# Patient Record
Sex: MALE | Race: WHITE | Employment: OTHER | URBAN - METROPOLITAN AREA
[De-identification: names, ages, dates, MRNs, and addresses within clinical notes are randomized per-mention and may not be internally consistent; named-entity substitution may affect disease eponyms.]

---

## 2020-03-09 ENCOUNTER — APPOINTMENT (OUTPATIENT)
Dept: GENERAL RADIOLOGY | Age: 35
End: 2020-03-09
Attending: EMERGENCY MEDICINE
Payer: MEDICAID

## 2020-03-09 LAB
ALBUMIN SERPL-MCNC: 4.1 G/DL (ref 3.5–5)
ALBUMIN/GLOB SERPL: 1.2 {RATIO} (ref 1.2–3.5)
ALP SERPL-CCNC: 99 U/L (ref 50–136)
ALT SERPL-CCNC: 53 U/L (ref 12–65)
ANION GAP SERPL CALC-SCNC: 5 MMOL/L (ref 7–16)
AST SERPL-CCNC: 25 U/L (ref 15–37)
BASOPHILS # BLD: 0.1 K/UL (ref 0–0.2)
BASOPHILS NFR BLD: 1 % (ref 0–2)
BILIRUB SERPL-MCNC: 0.4 MG/DL (ref 0.2–1.1)
BUN SERPL-MCNC: 19 MG/DL (ref 6–23)
CALCIUM SERPL-MCNC: 9.1 MG/DL (ref 8.3–10.4)
CHLORIDE SERPL-SCNC: 111 MMOL/L (ref 98–107)
CO2 SERPL-SCNC: 26 MMOL/L (ref 21–32)
CREAT SERPL-MCNC: 1.1 MG/DL (ref 0.8–1.5)
DIFFERENTIAL METHOD BLD: ABNORMAL
EOSINOPHIL # BLD: 0.2 K/UL (ref 0–0.8)
EOSINOPHIL NFR BLD: 3 % (ref 0.5–7.8)
ERYTHROCYTE [DISTWIDTH] IN BLOOD BY AUTOMATED COUNT: 12.4 % (ref 11.9–14.6)
GLOBULIN SER CALC-MCNC: 3.4 G/DL (ref 2.3–3.5)
GLUCOSE SERPL-MCNC: 92 MG/DL (ref 65–100)
HCT VFR BLD AUTO: 40.4 % (ref 41.1–50.3)
HGB BLD-MCNC: 13.1 G/DL (ref 13.6–17.2)
IMM GRANULOCYTES # BLD AUTO: 0 K/UL (ref 0–0.5)
IMM GRANULOCYTES NFR BLD AUTO: 0 % (ref 0–5)
LYMPHOCYTES # BLD: 2.5 K/UL (ref 0.5–4.6)
LYMPHOCYTES NFR BLD: 35 % (ref 13–44)
MCH RBC QN AUTO: 28.9 PG (ref 26.1–32.9)
MCHC RBC AUTO-ENTMCNC: 32.4 G/DL (ref 31.4–35)
MCV RBC AUTO: 89.2 FL (ref 79.6–97.8)
MONOCYTES # BLD: 0.5 K/UL (ref 0.1–1.3)
MONOCYTES NFR BLD: 7 % (ref 4–12)
NEUTS SEG # BLD: 3.8 K/UL (ref 1.7–8.2)
NEUTS SEG NFR BLD: 53 % (ref 43–78)
NRBC # BLD: 0 K/UL (ref 0–0.2)
PLATELET # BLD AUTO: 277 K/UL (ref 150–450)
PMV BLD AUTO: 9.1 FL (ref 9.4–12.3)
POTASSIUM SERPL-SCNC: 3.9 MMOL/L (ref 3.5–5.1)
PROT SERPL-MCNC: 7.5 G/DL (ref 6.3–8.2)
RBC # BLD AUTO: 4.53 M/UL (ref 4.23–5.6)
SODIUM SERPL-SCNC: 142 MMOL/L (ref 136–145)
TROPONIN I SERPL-MCNC: <0.02 NG/ML (ref 0.02–0.05)
WBC # BLD AUTO: 7 K/UL (ref 4.3–11.1)

## 2020-03-09 PROCEDURE — 71046 X-RAY EXAM CHEST 2 VIEWS: CPT

## 2020-03-09 PROCEDURE — 99284 EMERGENCY DEPT VISIT MOD MDM: CPT

## 2020-03-09 PROCEDURE — 80053 COMPREHEN METABOLIC PANEL: CPT

## 2020-03-09 PROCEDURE — 93005 ELECTROCARDIOGRAM TRACING: CPT | Performed by: EMERGENCY MEDICINE

## 2020-03-09 PROCEDURE — 85025 COMPLETE CBC W/AUTO DIFF WBC: CPT

## 2020-03-09 PROCEDURE — 84484 ASSAY OF TROPONIN QUANT: CPT

## 2020-03-10 ENCOUNTER — HOSPITAL ENCOUNTER (EMERGENCY)
Age: 35
Discharge: HOME | End: 2020-03-10
Attending: EMERGENCY MEDICINE
Payer: MEDICAID

## 2020-03-10 VITALS
HEART RATE: 65 BPM | HEIGHT: 69 IN | TEMPERATURE: 98.2 F | RESPIRATION RATE: 18 BRPM | DIASTOLIC BLOOD PRESSURE: 77 MMHG | OXYGEN SATURATION: 97 % | WEIGHT: 218 LBS | BODY MASS INDEX: 32.29 KG/M2 | SYSTOLIC BLOOD PRESSURE: 115 MMHG

## 2020-03-10 DIAGNOSIS — R07.89 ATYPICAL CHEST PAIN: Primary | ICD-10-CM

## 2020-03-10 LAB
ATRIAL RATE: 74 BPM
CALCULATED P AXIS, ECG09: 61 DEGREES
CALCULATED R AXIS, ECG10: 30 DEGREES
CALCULATED T AXIS, ECG11: 37 DEGREES
DIAGNOSIS, 93000: NORMAL
P-R INTERVAL, ECG05: 174 MS
Q-T INTERVAL, ECG07: 384 MS
QRS DURATION, ECG06: 92 MS
QTC CALCULATION (BEZET), ECG08: 426 MS
TROPONIN I SERPL-MCNC: <0.02 NG/ML (ref 0.02–0.05)
VENTRICULAR RATE, ECG03: 74 BPM

## 2020-03-10 PROCEDURE — 74011250637 HC RX REV CODE- 250/637: Performed by: EMERGENCY MEDICINE

## 2020-03-10 PROCEDURE — 84484 ASSAY OF TROPONIN QUANT: CPT

## 2020-03-10 RX ORDER — IBUPROFEN 800 MG/1
800 TABLET ORAL
Status: COMPLETED | OUTPATIENT
Start: 2020-03-10 | End: 2020-03-10

## 2020-03-10 RX ADMIN — IBUPROFEN 800 MG: 800 TABLET, FILM COATED ORAL at 02:54

## 2020-03-10 NOTE — ED PROVIDER NOTES
79-year-old male with no pertinent past past medical history presents with complaint of right-sided chest discomfort that is been going on for the past week. 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 Denies shortness of breath. Denies pleuritic chest discomfort. Denies fever, chills, hemoptysis, leg swelling or pain. Denies history of CAD, DVT, PE. The history is provided by the patient. No  was used. Chest Pain    This is a new problem. The current episode started more than 2 days ago. The problem has not changed since onset. The problem occurs rarely. The pain is associated with rest. The pain is present in the right side. The pain is at a severity of 2/10. The pain is mild. The quality of the pain is described as sharp. The pain does not radiate. Pertinent negatives include no abdominal pain, no back pain, no claudication, no cough, no diaphoresis, no dizziness, no exertional chest pressure, no fever, no headaches, no hemoptysis, no irregular heartbeat, no leg pain, no lower extremity edema, no malaise/fatigue, no nausea, no near-syncope, no numbness, no orthopnea, no palpitations, no PND, no shortness of breath, no sputum production, no vomiting and no weakness. He has tried nothing for the symptoms. The treatment provided no relief. No past medical history on file. No past surgical history on file. No family history on file.     Social History     Socioeconomic History    Marital status:      Spouse name: Not on file    Number of children: Not on file    Years of education: Not on file    Highest education level: Not on file   Occupational History    Not on file   Social Needs    Financial resource strain: Not on file    Food insecurity:     Worry: Not on file     Inability: Not on file    Transportation needs:     Medical: Not on file     Non-medical: Not on file   Tobacco Use    Smoking status: Not on file Substance and Sexual Activity    Alcohol use: Not on file    Drug use: Not on file    Sexual activity: Not on file   Lifestyle    Physical activity:     Days per week: Not on file     Minutes per session: Not on file    Stress: Not on file   Relationships    Social connections:     Talks on phone: Not on file     Gets together: Not on file     Attends [employer] service: Not on file     Active member of club or organization: Not on file     Attends meetings of clubs or organizations: Not on file     Relationship status: Not on file    Intimate partner violence:     Fear of current or ex partner: Not on file     Emotionally abused: Not on file     Physically abused: Not on file     Forced sexual activity: Not on file   Other Topics Concern    Not on file   Social History Narrative    Not on file         ALLERGIES: Patient has no known allergies. Review of Systems   Constitutional: Negative for diaphoresis, fatigue, fever and malaise/fatigue. HENT: Negative for congestion, rhinorrhea and sore throat. Respiratory: Negative for cough, hemoptysis, sputum production, shortness of breath and wheezing. Cardiovascular: Positive for chest pain. Negative for palpitations, orthopnea, claudication, leg swelling, PND and near-syncope. Gastrointestinal: Negative for abdominal pain, nausea and vomiting. Genitourinary: Negative for dysuria and flank pain. Musculoskeletal: Negative for back pain, myalgias and neck pain. Skin: Negative for rash. Neurological: Negative for dizziness, syncope, weakness, light-headedness, numbness and headaches. Vitals:    03/09/20 2134 03/10/20 0153   BP: 134/79    Pulse: 76 70   Resp: 18 18   Temp: 98.2 °F (36.8 °C)    SpO2: 97% 97%   Weight: 98.9 kg (218 lb)    Height: 5' 9\" (1.753 m)             Physical Exam  Vitals signs and nursing note reviewed. Constitutional:       Appearance: He is well-developed.       Comments: Patient sitting up in bed no acute distress. HENT:      Head: Normocephalic. Neck:      Vascular: No JVD. Cardiovascular:      Rate and Rhythm: Normal rate and regular rhythm. Heart sounds: Normal heart sounds. Comments: Pulses 2+ & equal throughout. Pulmonary:      Effort: Pulmonary effort is normal.      Breath sounds: Normal breath sounds. Comments: CTAB. Chest:      Comments: Mild R sided chest wall TTP. No crepitus. Abdominal:      Palpations: Abdomen is soft. Tenderness: There is no abdominal tenderness. Comments: Soft, NTND. Musculoskeletal: Normal range of motion. Right lower leg: He exhibits no tenderness. No edema. Left lower leg: He exhibits no tenderness. No edema. Comments: No LE edema. No calf TTP. Skin:     Findings: No erythema. Neurological:      General: No focal deficit present. Mental Status: He is alert and oriented to person, place, and time. Motor: No weakness. MDM  Number of Diagnoses or Management Options  Atypical chest pain: new and requires workup  Diagnosis management comments: EKG with normal sinus rhythm. Chest x-ray clear. Labs unremarkable. Initial and repeat troponin within normal notes. No concern for PE at this time. O2 sats 98% on room air. Denies any pleuritic component. We will discharge home with instruction for close outpatient follow-up. Instructed patient to treat pain with anti-inflammatory such as ibuprofen. Patient given strict return precautions.        Amount and/or Complexity of Data Reviewed  Clinical lab tests: ordered and reviewed  Tests in the radiology section of CPT®: ordered and reviewed  Tests in the medicine section of CPT®: ordered and reviewed  Review and summarize past medical records: yes  Independent visualization of images, tracings, or specimens: yes    Risk of Complications, Morbidity, and/or Mortality  Presenting problems: moderate  Diagnostic procedures: moderate  Management options: moderate    Patient Progress  Patient progress: stable         EKG  Date/Time: 3/10/2020 2:42 AM  Performed by: Valdemar Zaragoza MD  Authorized by: Valdemar Zaragoza MD     ECG reviewed by ED Physician in the absence of a cardiologist: yes    Rate:     ECG rate:  74    ECG rate assessment: normal    Rhythm:     Rhythm: sinus rhythm    Ectopy:     Ectopy: none    QRS:     QRS axis:  Normal    QRS intervals:  Normal  Conduction:     Conduction: normal    ST segments:     ST segments:  Normal  T waves:     T waves: normal              Results Include:    Recent Results (from the past 24 hour(s))   EKG, 12 LEAD, INITIAL    Collection Time: 03/09/20  9:36 PM   Result Value Ref Range    Ventricular Rate 74 BPM    Atrial Rate 74 BPM    P-R Interval 174 ms    QRS Duration 92 ms    Q-T Interval 384 ms    QTC Calculation (Bezet) 426 ms    Calculated P Axis 61 degrees    Calculated R Axis 30 degrees    Calculated T Axis 37 degrees    Diagnosis       Normal sinus rhythm  Normal ECG  No previous ECGs available     CBC WITH AUTOMATED DIFF    Collection Time: 03/09/20  9:37 PM   Result Value Ref Range    WBC 7.0 4.3 - 11.1 K/uL    RBC 4.53 4.23 - 5.6 M/uL    HGB 13.1 (L) 13.6 - 17.2 g/dL    HCT 40.4 (L) 41.1 - 50.3 %    MCV 89.2 79.6 - 97.8 FL    MCH 28.9 26.1 - 32.9 PG    MCHC 32.4 31.4 - 35.0 g/dL    RDW 12.4 11.9 - 14.6 %    PLATELET 767 432 - 006 K/uL    MPV 9.1 (L) 9.4 - 12.3 FL    ABSOLUTE NRBC 0.00 0.0 - 0.2 K/uL    DF AUTOMATED      NEUTROPHILS 53 43 - 78 %    LYMPHOCYTES 35 13 - 44 %    MONOCYTES 7 4.0 - 12.0 %    EOSINOPHILS 3 0.5 - 7.8 %    BASOPHILS 1 0.0 - 2.0 %    IMMATURE GRANULOCYTES 0 0.0 - 5.0 %    ABS. NEUTROPHILS 3.8 1.7 - 8.2 K/UL    ABS. LYMPHOCYTES 2.5 0.5 - 4.6 K/UL    ABS. MONOCYTES 0.5 0.1 - 1.3 K/UL    ABS. EOSINOPHILS 0.2 0.0 - 0.8 K/UL    ABS. BASOPHILS 0.1 0.0 - 0.2 K/UL    ABS. IMM.  GRANS. 0.0 0.0 - 0.5 K/UL   METABOLIC PANEL, COMPREHENSIVE    Collection Time: 03/09/20  9:37 PM Result Value Ref Range    Sodium 142 136 - 145 mmol/L    Potassium 3.9 3.5 - 5.1 mmol/L    Chloride 111 (H) 98 - 107 mmol/L    CO2 26 21 - 32 mmol/L    Anion gap 5 (L) 7 - 16 mmol/L    Glucose 92 65 - 100 mg/dL    BUN 19 6 - 23 MG/DL    Creatinine 1.10 0.8 - 1.5 MG/DL    GFR est AA >60 >60 ml/min/1.73m2    GFR est non-AA >60 >60 ml/min/1.73m2    Calcium 9.1 8.3 - 10.4 MG/DL    Bilirubin, total 0.4 0.2 - 1.1 MG/DL    ALT (SGPT) 53 12 - 65 U/L    AST (SGOT) 25 15 - 37 U/L    Alk. phosphatase 99 50 - 136 U/L    Protein, total 7.5 6.3 - 8.2 g/dL    Albumin 4.1 3.5 - 5.0 g/dL    Globulin 3.4 2.3 - 3.5 g/dL    A-G Ratio 1.2 1.2 - 3.5     TROPONIN I    Collection Time: 03/09/20  9:37 PM   Result Value Ref Range    Troponin-I, Qt. <0.02 (L) 0.02 - 0.05 NG/ML   TROPONIN I    Collection Time: 03/10/20  1:47 AM   Result Value Ref Range    Troponin-I, Qt. <0.02 (L) 0.02 - 0.05 NG/ML              Kaden Miner MD; 3/10/2020 @2:42 AM Voice dictation software was used during the making of this note. This software is not perfect and grammatical and other typographical errors may be present.   This note has not been proofread for errors.  ===================================================================

## 2020-03-10 NOTE — ED NOTES
I have reviewed discharge instructions with the patient and spouse. The patient and spouse verbalized understanding. Patient left ED via Discharge Method: ambulatory to Home with spouse    Opportunity for questions and clarification provided. Patient given 0 scripts. To continue your aftercare when you leave the hospital, you may receive an automated call from our care team to check in on how you are doing. This is a free service and part of our promise to provide the best care and service to meet your aftercare needs.  If you have questions, or wish to unsubscribe from this service please call [phone]. Thank you for Choosing our [facility].

## 2020-03-10 NOTE — ED TRIAGE NOTES
Reports right sided chest pain, onset approx 1 week pta. Describes as \"shooting\" pain and intermittent since onset. Reports shortness of breath with \"yawning and bending over\". Denies cough, n/v/d, fever/chills. Reports increased stress with work. Admits to increased caffeine intake. States similar symptoms in past, diagnosed with \"panic attack\" at that time 2 years ago.  Denies hyperventilation or rapid heart rate at onset or prior to pain

## 2020-03-10 NOTE — DISCHARGE INSTRUCTIONS
Take ibuprofen as directed for pain. Schedule close follow-up primary care physician. Return to ED if symptoms worsen or progress in any way. Musculoskeletal Chest Pain: Care Instructions  Your Care Instructions    Chest pain is not always a sign that something is wrong with your heart or that you have another serious problem. The doctor thinks your chest pain is caused by strained muscles or ligaments, inflamed chest cartilage, or another problem in your chest, rather than by your heart. You may need more tests to find the cause of your chest pain. Follow-up care is a key part of your treatment and safety. Be sure to make and go to all appointments, and call your doctor if you are having problems. It's also a good idea to know your test results and keep a list of the medicines you take. How can you care for yourself at home? · Take pain medicines exactly as directed. ? If the doctor gave you a prescription medicine for pain, take it as prescribed. ? If you are not taking a prescription pain medicine, ask your doctor if you can take an over-the-counter medicine. · Rest and protect the sore area. · Stop, change, or take a break from any activity that may be causing your pain or soreness. · Put ice or a cold pack on the sore area for 10 to 20 minutes at a time. Try to do this every 1 to 2 hours for the next 3 days (when you are awake) or until the swelling goes down. Put a thin cloth between the ice and your skin. · After 2 or 3 days, apply a heating pad set on low or a warm cloth to the area that hurts. Some doctors suggest that you go back and forth between hot and cold. · Do not wrap or tape your ribs for support. This may cause you to take smaller breaths, which could increase your risk of lung problems. · Mentholated creams such as Bengay or Icy Hot may soothe sore muscles. Follow the instructions on the package. · Follow your doctor's instructions for exercising.   · Gentle stretching and massage may help you get better faster. Stretch slowly to the point just before pain begins, and hold the stretch for at least 15 to 30 seconds. Do this 3 or 4 times a day. Stretch just after you have applied heat. · As your pain gets better, slowly return to your normal activities. Any increased pain may be a sign that you need to rest a while longer. When should you call for help? Call 911 anytime you think you may need emergency care. For example, call if:    · You have chest pain or pressure. This may occur with:  ? Sweating. ? Shortness of breath. ? Nausea or vomiting. ? Pain that spreads from the chest to the neck, jaw, or one or both shoulders or arms. ? Dizziness or lightheadedness. ? A fast or uneven pulse. After calling 911, chew 1 adult-strength aspirin. Wait for an ambulance. Do not try to drive yourself.     · You have sudden chest pain and shortness of breath, or you cough up blood.    Call your doctor now or seek immediate medical care if:    · You have any trouble breathing.     · Your chest pain gets worse.     · Your chest pain occurs consistently with exercise and is relieved by rest.    Watch closely for changes in your health, and be sure to contact your doctor if:    · Your chest pain does not get better after 1 week. Where can you learn more? Go to http://norah-bj.info/. Enter V293 in the search box to learn more about \"Musculoskeletal Chest Pain: Care Instructions. \"  Current as of: June 26, 2019  Content Version: 12.2  © 5994-0048 [employer]. Care instructions adapted under license by [employer] (which disclaims liability or warranty for this information). If you have questions about a medical condition or this instruction, always ask your healthcare professional. [place] [address] any warranty or liability for your use of this information.          Patient Education        Chest Pain: Care Instructions  Your Care Instructions    There are many things that can cause chest pain. Some are not serious and will get better on their own in a few days. But some kinds of chest pain need more testing and treatment. Your doctor may have recommended a follow-up visit in the next 8 to 12 hours. If you are not getting better, you may need more tests or treatment. Even though your doctor has released you, you still need to watch for any problems. The doctor carefully checked you, but sometimes problems can develop later. If you have new symptoms or if your symptoms do not get better, get medical care right away. If you have worse or different chest pain or pressure that lasts more than 5 minutes or you passed out (lost consciousness), call 911 or seek other emergency help right away. A medical visit is only one step in your treatment. Even if you feel better, you still need to do what your doctor recommends, such as going to all suggested follow-up appointments and taking medicines exactly as directed. This will help you recover and help prevent future problems. How can you care for yourself at home? · Rest until you feel better. · Take your medicine exactly as prescribed. Call your doctor if you think you are having a problem with your medicine. · Do not drive after taking a prescription pain medicine. When should you call for help? Call 911 if:    · You passed out (lost consciousness).     · You have severe difficulty breathing.     · You have symptoms of a heart attack. These may include:  ? Chest pain or pressure, or a strange feeling in your chest.  ? Sweating. ? Shortness of breath. ? Nausea or vomiting. ? Pain, pressure, or a strange feeling in your back, neck, jaw, or upper belly or in one or both shoulders or arms. ? Lightheadedness or sudden weakness. ? A fast or irregular heartbeat. After you call 911, the  may tell you to chew 1 adult-strength or 2 to 4 low-dose aspirin. Wait for an ambulance.  Do not try to drive yourself.    Call your doctor today if:    · You have any trouble breathing.     · Your chest pain gets worse.     · You are dizzy or lightheaded, or you feel like you may faint.     · You are not getting better as expected.     · You are having new or different chest pain. Where can you learn more? Go to http://norah-bj.info/. Enter A120 in the search box to learn more about \"Chest Pain: Care Instructions. \"  Current as of: June 26, 2019  Content Version: 12.2  © 1082-8617 [employer]. Care instructions adapted under license by [employer] (which disclaims liability or warranty for this information). If you have questions about a medical condition or this instruction, always ask your healthcare professional. [address] any warranty or liability for your use of this information.

## 2020-06-05 ENCOUNTER — HOSPITAL ENCOUNTER (EMERGENCY)
Age: 35
Discharge: HOME | End: 2020-06-06
Attending: EMERGENCY MEDICINE
Payer: MEDICAID

## 2020-06-05 ENCOUNTER — APPOINTMENT (OUTPATIENT)
Dept: GENERAL RADIOLOGY | Age: 35
End: 2020-06-05
Attending: EMERGENCY MEDICINE
Payer: MEDICAID

## 2020-06-05 DIAGNOSIS — R79.89 ELEVATED LFTS: Primary | ICD-10-CM

## 2020-06-05 DIAGNOSIS — R10.13 ABDOMINAL PAIN, EPIGASTRIC: ICD-10-CM

## 2020-06-05 LAB
ALBUMIN SERPL-MCNC: 3.5 G/DL (ref 3.5–5)
ALBUMIN/GLOB SERPL: 0.8 {RATIO} (ref 1.2–3.5)
ALP SERPL-CCNC: 253 U/L (ref 50–136)
ALT SERPL-CCNC: 1617 U/L (ref 12–65)
ANION GAP SERPL CALC-SCNC: 8 MMOL/L (ref 7–16)
AST SERPL-CCNC: 767 U/L (ref 15–37)
BILIRUB SERPL-MCNC: 1.9 MG/DL (ref 0.2–1.1)
BUN SERPL-MCNC: 15 MG/DL (ref 6–23)
CALCIUM SERPL-MCNC: 8.6 MG/DL (ref 8.3–10.4)
CHLORIDE SERPL-SCNC: 110 MMOL/L (ref 98–107)
CO2 SERPL-SCNC: 23 MMOL/L (ref 21–32)
CREAT SERPL-MCNC: 1.2 MG/DL (ref 0.8–1.5)
GLOBULIN SER CALC-MCNC: 4.2 G/DL (ref 2.3–3.5)
GLUCOSE SERPL-MCNC: 105 MG/DL (ref 65–100)
POTASSIUM SERPL-SCNC: 3.5 MMOL/L (ref 3.5–5.1)
PROT SERPL-MCNC: 7.7 G/DL (ref 6.3–8.2)
SODIUM SERPL-SCNC: 141 MMOL/L (ref 136–145)
TROPONIN-HIGH SENSITIVITY: 5.2 PG/ML (ref 0–14)

## 2020-06-05 PROCEDURE — 99285 EMERGENCY DEPT VISIT HI MDM: CPT

## 2020-06-05 PROCEDURE — 85025 COMPLETE CBC W/AUTO DIFF WBC: CPT

## 2020-06-05 PROCEDURE — 81003 URINALYSIS AUTO W/O SCOPE: CPT

## 2020-06-05 PROCEDURE — 80074 ACUTE HEPATITIS PANEL: CPT

## 2020-06-05 PROCEDURE — 84484 ASSAY OF TROPONIN QUANT: CPT

## 2020-06-05 PROCEDURE — 71045 X-RAY EXAM CHEST 1 VIEW: CPT

## 2020-06-05 PROCEDURE — 83690 ASSAY OF LIPASE: CPT

## 2020-06-05 PROCEDURE — 93005 ELECTROCARDIOGRAM TRACING: CPT | Performed by: EMERGENCY MEDICINE

## 2020-06-05 PROCEDURE — 80053 COMPREHEN METABOLIC PANEL: CPT

## 2020-06-06 ENCOUNTER — APPOINTMENT (OUTPATIENT)
Dept: ULTRASOUND IMAGING | Age: 35
End: 2020-06-06
Attending: EMERGENCY MEDICINE
Payer: MEDICAID

## 2020-06-06 VITALS
RESPIRATION RATE: 23 BRPM | BODY MASS INDEX: 32.29 KG/M2 | HEART RATE: 69 BPM | WEIGHT: 218 LBS | SYSTOLIC BLOOD PRESSURE: 141 MMHG | DIASTOLIC BLOOD PRESSURE: 81 MMHG | OXYGEN SATURATION: 97 % | HEIGHT: 69 IN | TEMPERATURE: 98.2 F

## 2020-06-06 LAB
ATRIAL RATE: 83 BPM
BASOPHILS # BLD: 0.1 K/UL (ref 0–0.2)
BASOPHILS NFR BLD: 1 % (ref 0–2)
CALCULATED P AXIS, ECG09: 60 DEGREES
CALCULATED R AXIS, ECG10: 17 DEGREES
CALCULATED T AXIS, ECG11: 31 DEGREES
DIAGNOSIS, 93000: NORMAL
DIFFERENTIAL METHOD BLD: ABNORMAL
EOSINOPHIL # BLD: 0.2 K/UL (ref 0–0.8)
EOSINOPHIL NFR BLD: 3 % (ref 0.5–7.8)
ERYTHROCYTE [DISTWIDTH] IN BLOOD BY AUTOMATED COUNT: 12.9 % (ref 11.9–14.6)
HCT VFR BLD AUTO: 37.1 % (ref 41.1–50.3)
HGB BLD-MCNC: 12.3 G/DL (ref 13.6–17.2)
IMM GRANULOCYTES # BLD AUTO: 0.1 K/UL (ref 0–0.5)
IMM GRANULOCYTES NFR BLD AUTO: 1 % (ref 0–5)
LIPASE SERPL-CCNC: 230 U/L (ref 73–393)
LYMPHOCYTES # BLD: 3.9 K/UL (ref 0.5–4.6)
LYMPHOCYTES NFR BLD: 62 % (ref 13–44)
MCH RBC QN AUTO: 28.9 PG (ref 26.1–32.9)
MCHC RBC AUTO-ENTMCNC: 33.2 G/DL (ref 31.4–35)
MCV RBC AUTO: 87.1 FL (ref 79.6–97.8)
MONOCYTES # BLD: 0.4 K/UL (ref 0.1–1.3)
MONOCYTES NFR BLD: 6 % (ref 4–12)
NEUTS SEG # BLD: 1.8 K/UL (ref 1.7–8.2)
NEUTS SEG NFR BLD: 27 % (ref 43–78)
NRBC # BLD: 0 K/UL (ref 0–0.2)
P-R INTERVAL, ECG05: 160 MS
PLATELET # BLD AUTO: 237 K/UL (ref 150–450)
PLATELET COMMENTS,PCOM: ADEQUATE
PMV BLD AUTO: 10 FL (ref 9.4–12.3)
Q-T INTERVAL, ECG07: 370 MS
QRS DURATION, ECG06: 90 MS
QTC CALCULATION (BEZET), ECG08: 434 MS
RBC # BLD AUTO: 4.26 M/UL (ref 4.23–5.6)
RBC MORPH BLD: ABNORMAL
VENTRICULAR RATE, ECG03: 83 BPM
WBC # BLD AUTO: 6.5 K/UL (ref 4.3–11.1)
WBC MORPH BLD: ABNORMAL

## 2020-06-06 PROCEDURE — 76705 ECHO EXAM OF ABDOMEN: CPT

## 2020-06-06 RX ORDER — ONDANSETRON 4 MG/1
4 TABLET, ORALLY DISINTEGRATING ORAL
Qty: 11 TAB | Refills: 1 | Status: SHIPPED | OUTPATIENT
Start: 2020-06-06

## 2020-06-06 NOTE — ED NOTES
This RN accessing chart and assisting primary RN as a float nurse assignment. This RN assisted with discharge process.

## 2020-06-06 NOTE — ED TRIAGE NOTES
Patient arrives to ED from home. Patient states he has been having sharp chest pain for the two weeks. Patient states he also has noticed his urine has an orange color to it like it might have blood in it. Patient denies pain or burning with urination. Denies flank pain. Patient states he's had nausea and vomiting x 4 days. Denies cardiac history. Patient states when he is a work he gets a little out of breath. Patient states he has been coughing on and off for this whole week. Patient also states he's had chills. Unsure if he had a fever.

## 2020-06-06 NOTE — DISCHARGE INSTRUCTIONS
Use the nausea medication as needed. Call the gastroenterology specialist on Monday morning as referred and schedule follow-up in the office. Return to the emergency department for any new or worsening symptoms.

## 2020-06-06 NOTE — ED NOTES
I have reviewed discharge instructions with the patient. The patient verbalized understanding. Patient left ED via Discharge Method: ambulatory to Home with self. Opportunity for questions and clarification provided. Patient given 1 scripts. To continue your aftercare when you leave the hospital, you may receive an automated call from our care team to check in on how you are doing. This is a free service and part of our promise to provide the best care and service to meet your aftercare needs.  If you have questions, or wish to unsubscribe from this service please call [phone]. Thank you for Choosing our [facility].

## 2020-06-06 NOTE — ED PROVIDER NOTES
Patient is a 78-year-old male who comes to the emergency department today with multiple complaints. Patient states he has been having sharp pains in the epigastrium and lower chest for about 2 weeks. Often worse when he tries to eat. He states he has had a bit of a cough for a few weeks. The past 3 or 4 days he has had a lot of nausea and vomiting. He states his urine has also been dark and discolored for 1 week. The history is provided by the patient and the spouse. Chest Pain    This is a new problem. The current episode started more than 1 week ago. The problem has been resolved. The problem occurs daily. The pain is associated with normal activity and eating food. The pain is present in the epigastric region. The pain is mild. The quality of the pain is described as sharp. The pain does not radiate. Associated symptoms include abdominal pain, cough, nausea and vomiting. Pertinent negatives include no back pain, no diaphoresis, no dizziness, no fever, no malaise/fatigue, no palpitations, no shortness of breath and no weakness. He has tried nothing for the symptoms. Risk factors include no risk factors. His past medical history does not include DM or HTN. Pertinent negatives include no cardiac catheterization and no exercise treadmill test.  Blood in Urine    Associated symptoms include nausea, vomiting and abdominal pain. Pertinent negatives include no chills, no frequency, no hematuria, no urgency and no back pain. No past medical history on file. No past surgical history on file. No family history on file.     Social History     Socioeconomic History    Marital status:      Spouse name: Not on file    Number of children: Not on file    Years of education: Not on file    Highest education level: Not on file   Occupational History    Not on file   Social Needs    Financial resource strain: Not on file    Food insecurity     Worry: Not on file     Inability: Not on file   [place] Transportation needs     Medical: Not on file     Non-medical: Not on file   Tobacco Use    Smoking status: Not on file   Substance and Sexual Activity    Alcohol use: Not on file    Drug use: Not on file    Sexual activity: Not on file   Lifestyle    Physical activity     Days per week: Not on file     Minutes per session: Not on file    Stress: Not on file   Relationships    Social connections     Talks on phone: Not on file     Gets together: Not on file     Attends [employer] service: Not on file     Active member of club or organization: Not on file     Attends meetings of clubs or organizations: Not on file     Relationship status: Not on file    Intimate partner violence     Fear of current or ex partner: Not on file     Emotionally abused: Not on file     Physically abused: Not on file     Forced sexual activity: Not on file   Other Topics Concern    Not on file   Social History Narrative    Not on file         ALLERGIES: Patient has no known allergies. Review of Systems   Constitutional: Negative for chills, diaphoresis, fatigue, fever and malaise/fatigue. HENT: Negative for congestion, rhinorrhea, sinus pressure, sore throat and trouble swallowing. Eyes: Negative for pain, discharge and visual disturbance. Respiratory: Positive for cough. Negative for shortness of breath. Cardiovascular: Positive for chest pain. Negative for palpitations. Gastrointestinal: Positive for abdominal pain, nausea and vomiting. Negative for diarrhea. Endocrine: Negative for polydipsia and polyuria. Genitourinary: Negative for discharge, dysuria, frequency, genital sores, hematuria, penile pain, testicular pain and urgency. Musculoskeletal: Negative for back pain and neck pain. Skin: Negative for rash. Neurological: Negative for dizziness, seizures, syncope and weakness. Hematological: Negative.         Vitals:    06/05/20 2157   BP: 129/70   Pulse: 82   Resp: 16   Temp: 98.6 °F (37 °C)   SpO2: 97%   Weight: 98.9 kg (218 lb)   Height: 5' 9\" (1.753 m)            Physical Exam  Vitals signs and nursing note reviewed. Constitutional:       Appearance: Normal appearance. He is well-developed. HENT:      Head: Normocephalic and atraumatic. Nose: Nose normal.   Eyes:      Extraocular Movements: Extraocular movements intact. Conjunctiva/sclera: Conjunctivae normal.      Pupils: Pupils are equal, round, and reactive to light. Neck:      Musculoskeletal: Normal range of motion and neck supple. Cardiovascular:      Rate and Rhythm: Normal rate and regular rhythm. Heart sounds: Normal heart sounds. Pulmonary:      Effort: Pulmonary effort is normal.      Breath sounds: Normal breath sounds. Abdominal:      Palpations: Abdomen is soft. Tenderness: There is abdominal tenderness. There is no guarding or rebound. Comments: Very mild epigastric tenderness with no peritoneal signs   Musculoskeletal: Normal range of motion. General: No tenderness. Right lower leg: No edema. Left lower leg: No edema. Lymphadenopathy:      Cervical: No cervical adenopathy. Skin:     General: Skin is warm and dry. Findings: No rash. Neurological:      General: No focal deficit present. Mental Status: He is alert and oriented to person, place, and time. GCS: GCS eye subscore is 4. GCS verbal subscore is 5. GCS motor subscore is 6. Cranial Nerves: No cranial nerve deficit. Sensory: No sensory deficit. Motor: Motor function is intact. Psychiatric:         Mood and Affect: Mood is anxious. MDM  Number of Diagnoses or Management Options  Diagnosis management comments: I wore appropriate PPE throughout this patient's ED visit.  Marlena Varghese MD, 10:14 PM    EKG reviewed by me shows normal sinus rhythm rate of 83, no arrhythmia, no acute ischemia    Urinalysis is negative    12:11 AM  X-ray is negative  Troponin 0  CBC unremarkable  Chemistries show an elevated bilirubin two 1.9 and elevated LFTs compared to values 3 months ago. Went back and reexamined the patient he does have mild tenderness in the epigastrium. We will check a gallbladder ultrasound to rule out stones or ductal obstruction. 12:44 AM  Lipase is normal  I have added on a hepatitis panel  Discussed the case with Dr. Corina Sandoval of gastroenterology could because he was in the ER seeing another patient. If the ultrasound is negative they will see the patient in follow-up in the office next week. 1:28 AM  Gallbladder ultrasound was negative. We will discharge patient to home he will follow-up with GI next week. Voice dictation software was used during the making of this note. This software is not perfect and grammatical and other typographical errors may be present. This note has been proofread, but may still contain errors.   Susanne Dugan MD; 6/6/2020 @1:29 AM   ===================================================================         Amount and/or Complexity of Data Reviewed  Clinical lab tests: ordered and reviewed  Tests in the radiology section of CPT®: ordered and reviewed  Tests in the medicine section of CPT®: ordered and reviewed  Discuss the patient with other providers: yes  Independent visualization of images, tracings, or specimens: yes    Risk of Complications, Morbidity, and/or Mortality  Presenting problems: moderate  Diagnostic procedures: low  Management options: low    Patient Progress  Patient progress: stable         Procedures

## 2020-06-07 LAB
HAV IGM SERPL QL IA: POSITIVE
HBV CORE IGM SERPL QL IA: NEGATIVE
HBV SURFACE AG SERPL QL IA: NEGATIVE
HCV AB S/CO SERPL IA: <0.1 S/CO RATIO (ref 0–0.9)

## 2020-06-07 NOTE — PROGRESS NOTES
Called patient with test results. He provided his name and date of birth for identification. He was given the positive hepatitis A results. He is starting to feel better. He will call GI tomorrow for follow-up. He will return to the ED if worsening.

## 2022-10-06 ENCOUNTER — HOSPITAL ENCOUNTER (EMERGENCY)
Dept: GENERAL RADIOLOGY | Age: 37
Discharge: HOME | End: 2022-10-09
Payer: MEDICAID

## 2022-10-06 ENCOUNTER — HOSPITAL ENCOUNTER (EMERGENCY)
Age: 37
Discharge: HOME | End: 2022-10-06
Attending: EMERGENCY MEDICINE
Payer: MEDICAID

## 2022-10-06 ENCOUNTER — APPOINTMENT (OUTPATIENT)
Dept: CT IMAGING | Age: 37
End: 2022-10-06
Payer: MEDICAID

## 2022-10-06 VITALS
WEIGHT: 224 LBS | RESPIRATION RATE: 20 BRPM | BODY MASS INDEX: 33.18 KG/M2 | TEMPERATURE: 98.2 F | HEART RATE: 66 BPM | DIASTOLIC BLOOD PRESSURE: 86 MMHG | SYSTOLIC BLOOD PRESSURE: 149 MMHG | HEIGHT: 69 IN | OXYGEN SATURATION: 97 %

## 2022-10-06 DIAGNOSIS — S39.012A STRAIN OF LUMBAR REGION, INITIAL ENCOUNTER: Primary | ICD-10-CM

## 2022-10-06 LAB
ALBUMIN SERPL-MCNC: 4.3 G/DL (ref 3.5–5)
ALBUMIN/GLOB SERPL: 1.4 {RATIO} (ref 1.2–3.5)
ALP SERPL-CCNC: 94 U/L (ref 50–136)
ALT SERPL-CCNC: 47 U/L (ref 12–65)
ANION GAP SERPL CALC-SCNC: 4 MMOL/L (ref 4–13)
AST SERPL-CCNC: 20 U/L (ref 15–37)
BASOPHILS # BLD: 0.1 K/UL (ref 0–0.2)
BASOPHILS NFR BLD: 1 % (ref 0–2)
BILIRUB SERPL-MCNC: 0.5 MG/DL (ref 0.2–1.1)
BILIRUB UR QL: NEGATIVE
BUN SERPL-MCNC: 20 MG/DL (ref 6–23)
CALCIUM SERPL-MCNC: 9 MG/DL (ref 8.3–10.4)
CHLORIDE SERPL-SCNC: 109 MMOL/L (ref 101–110)
CO2 SERPL-SCNC: 25 MMOL/L (ref 21–32)
CREAT SERPL-MCNC: 1 MG/DL (ref 0.8–1.5)
DIFFERENTIAL METHOD BLD: ABNORMAL
EOSINOPHIL # BLD: 0.1 K/UL (ref 0–0.8)
EOSINOPHIL NFR BLD: 3 % (ref 0.5–7.8)
ERYTHROCYTE [DISTWIDTH] IN BLOOD BY AUTOMATED COUNT: 12.7 % (ref 11.9–14.6)
GLOBULIN SER CALC-MCNC: 3 G/DL (ref 2.3–3.5)
GLUCOSE SERPL-MCNC: 116 MG/DL (ref 65–100)
GLUCOSE UR QL STRIP.AUTO: NEGATIVE MG/DL
HCT VFR BLD AUTO: 42.4 % (ref 41.1–50.3)
HGB BLD-MCNC: 13.7 G/DL (ref 13.6–17.2)
IMM GRANULOCYTES # BLD AUTO: 0 K/UL (ref 0–0.5)
IMM GRANULOCYTES NFR BLD AUTO: 0 % (ref 0–5)
KETONES UR-MCNC: NEGATIVE MG/DL
LEUKOCYTE ESTERASE UR QL STRIP: NEGATIVE
LYMPHOCYTES # BLD: 1.9 K/UL (ref 0.5–4.6)
LYMPHOCYTES NFR BLD: 37 % (ref 13–44)
MCH RBC QN AUTO: 28.8 PG (ref 26.1–32.9)
MCHC RBC AUTO-ENTMCNC: 32.3 G/DL (ref 31.4–35)
MCV RBC AUTO: 89.1 FL (ref 79.6–97.8)
MONOCYTES # BLD: 0.4 K/UL (ref 0.1–1.3)
MONOCYTES NFR BLD: 8 % (ref 4–12)
NEUTS SEG # BLD: 2.7 K/UL (ref 1.7–8.2)
NEUTS SEG NFR BLD: 51 % (ref 43–78)
NITRITE UR QL: NEGATIVE
NRBC # BLD: 0 K/UL (ref 0–0.2)
PH UR: 6 [PH] (ref 5–9)
PLATELET # BLD AUTO: 261 K/UL (ref 150–450)
PMV BLD AUTO: 8.9 FL (ref 9.4–12.3)
POTASSIUM SERPL-SCNC: 3.9 MMOL/L (ref 3.5–5.1)
PROT SERPL-MCNC: 7.3 G/DL (ref 6.3–8.2)
PROT UR QL: NEGATIVE MG/DL
RBC # BLD AUTO: 4.76 M/UL (ref 4.23–5.6)
RBC # UR STRIP: NEGATIVE /UL
SERVICE CMNT-IMP: ABNORMAL
SODIUM SERPL-SCNC: 138 MMOL/L (ref 138–145)
SP GR UR: 1.02 (ref 1–1.02)
UROBILINOGEN UR QL: 0.2 EU/DL (ref 0.2–1)
WBC # BLD AUTO: 5.1 K/UL (ref 4.3–11.1)

## 2022-10-06 PROCEDURE — 6370000000 HC RX 637 (ALT 250 FOR IP)

## 2022-10-06 PROCEDURE — 99284 EMERGENCY DEPT VISIT MOD MDM: CPT

## 2022-10-06 PROCEDURE — 74176 CT ABD & PELVIS W/O CONTRAST: CPT

## 2022-10-06 PROCEDURE — 72100 X-RAY EXAM L-S SPINE 2/3 VWS: CPT

## 2022-10-06 PROCEDURE — 80053 COMPREHEN METABOLIC PANEL: CPT

## 2022-10-06 PROCEDURE — 81003 URINALYSIS AUTO W/O SCOPE: CPT

## 2022-10-06 PROCEDURE — 85025 COMPLETE CBC W/AUTO DIFF WBC: CPT

## 2022-10-06 RX ORDER — METHOCARBAMOL 500 MG/1
750 TABLET, FILM COATED ORAL ONCE
Status: COMPLETED | OUTPATIENT
Start: 2022-10-06 | End: 2022-10-06

## 2022-10-06 RX ORDER — IBUPROFEN 600 MG/1
600 TABLET ORAL 3 TIMES DAILY PRN
Qty: 21 TABLET | Refills: 0 | Status: SHIPPED | OUTPATIENT
Start: 2022-10-06 | End: 2022-10-13

## 2022-10-06 RX ORDER — METHOCARBAMOL 750 MG/1
750 TABLET, FILM COATED ORAL 4 TIMES DAILY
Qty: 28 TABLET | Refills: 0 | Status: SHIPPED | OUTPATIENT
Start: 2022-10-06 | End: 2022-10-13

## 2022-10-06 RX ADMIN — METHOCARBAMOL TABLETS 750 MG: 500 TABLET, COATED ORAL at 11:48

## 2022-10-06 ASSESSMENT — ENCOUNTER SYMPTOMS
BOWEL INCONTINENCE: 0
ABDOMINAL DISTENTION: 0
ABDOMINAL PAIN: 0
DIARRHEA: 0
SHORTNESS OF BREATH: 0
BACK PAIN: 1
COLOR CHANGE: 0
CHEST TIGHTNESS: 0
STRIDOR: 0
ABDOMINAL SWELLING: 0
VOMITING: 0
WHEEZING: 0
NAUSEA: 0

## 2022-10-06 ASSESSMENT — PAIN SCALES - GENERAL: PAINLEVEL_OUTOF10: 10

## 2022-10-06 NOTE — ED PROVIDER NOTES
Emergency Department Provider Note                   PCP:                NOT ON FILE               Age: 29 y.o. Sex: male       ICD-10-CM    1. Strain of lumbar region, initial encounter  S39.012A           DISPOSITION Decision To Discharge 10/06/2022 01:59:38 PM        MDM  Number of Diagnoses or Management Options  Strain of lumbar region, initial encounter: new, needed workup  Diagnosis management comments: 29-year-old male presents with low back pain at L4-L5 moving a Granite countertop. Lumbar film showed possible renal calculi. CT renal stone protocol negative for renal calculi. CBC, BMP, urinalysis unremarkable. Patient discharged with Robaxin and ibuprofen. Amount and/or Complexity of Data Reviewed  Clinical lab tests: ordered and reviewed  Tests in the radiology section of CPT®: ordered and reviewed    Risk of Complications, Morbidity, and/or Mortality  Presenting problems: low  Diagnostic procedures: low  Management options: low    Patient Progress  Patient progress: stable       ED Course as of 10/06/22 1620   Thu Oct 06, 2022   1151 Lumbar x-ray shows suspected renal calculus. We will check labs and urine as well as renal CT. [CJ]   1357 CT abdomen pelvis negative for renal calculus. Shows bulging disc at L4-L5 posterior. Will discharge with muscle relaxers and NSAIDs.  [CJ]      ED Course User Index  [CJ] Marissa Cheney, APRN - CNP        Orders Placed This Encounter   Procedures    XR LUMBAR SPINE (2-3 VIEWS)    CT ABDOMEN PELVIS RENAL STONE Additional Contrast? None    CBC with Auto Differential    Comprehensive Metabolic Panel    POCT Urine Dipstick    POCT Urinalysis no Micro        Medications   methocarbamol (ROBAXIN) tablet 750 mg (750 mg Oral Given 10/6/22 1148)       Discharge Medication List as of 10/6/2022  2:36 PM        START taking these medications    Details   methocarbamol (ROBAXIN-750) 750 MG tablet Take 1 tablet by mouth 4 times daily for 7 days, Disp-28 tablet, R-0Print      ibuprofen (ADVIL;MOTRIN) 600 MG tablet Take 1 tablet by mouth 3 times daily as needed for Pain, Disp-21 tablet, R-0Print              Kerry Norton is a 29 y.o. male who presents to the Emergency Department with chief complaint of    Chief Complaint   Patient presents with    Back Pain      80-year-old male presents with lower back pain for 1 day. States that this pain started shortly after attempting to move a large granite countertop. States that the pain radiates across his lower back, but does not radiate down legs. Denies numbness, tingling, bowel, bladder incontinence, nausea or vomiting, or dysuria. States that he took Aleve this morning without relief of the pain. Back Pain  Location:  Lumbar spine  Quality:  Aching  Pain severity:  Moderate  Onset quality:  Sudden  Duration:  1 day  Timing:  Constant  Progression:  Unchanged  Chronicity:  New  Context: lifting heavy objects, physical stress and twisting    Context: not emotional stress, not falling, not jumping from heights, not MCA, not MVA, not occupational injury, not pedestrian accident and not recent illness    Relieved by:  Nothing  Ineffective treatments:  NSAIDs  Associated symptoms: no abdominal pain, no abdominal swelling, no bladder incontinence, no bowel incontinence, no chest pain, no dysuria, no fever, no headaches, no leg pain, no numbness, no paresthesias, no pelvic pain, no perianal numbness, no tingling, no weakness and no weight loss    Risk factors: no hx of cancer, no hx of osteoporosis, no lack of exercise, no menopause, not obese, not pregnant, no recent surgery, no steroid use and no vascular disease        Review of Systems   Constitutional:  Negative for activity change, chills, fatigue, fever and weight loss. Respiratory:  Negative for chest tightness, shortness of breath, wheezing and stridor. Cardiovascular:  Negative for chest pain and palpitations.    Gastrointestinal:  Negative for abdominal distention, abdominal pain, bowel incontinence, diarrhea, nausea and vomiting. Genitourinary:  Negative for bladder incontinence, dysuria and pelvic pain. Musculoskeletal:  Positive for back pain. Negative for arthralgias, gait problem, joint swelling and myalgias. Skin:  Negative for color change, pallor, rash and wound. Neurological:  Negative for dizziness, tingling, weakness, numbness, headaches and paresthesias. Psychiatric/Behavioral:  Negative for agitation, behavioral problems and confusion. All other systems reviewed and are negative. No past medical history on file. No past surgical history on file. No family history on file. Social History     Socioeconomic History    Marital status:          Patient has no known allergies. Discharge Medication List as of 10/6/2022  2:36 PM        CONTINUE these medications which have NOT CHANGED    Details   ondansetron (ZOFRAN-ODT) 4 MG disintegrating tablet Take 4 mg by mouth every 8 hours as neededHistorical Med              Vitals signs and nursing note reviewed. Patient Vitals for the past 4 hrs:   Pulse BP SpO2   10/06/22 1438 66 (!) 149/86 97 %          Physical Exam  Vitals and nursing note reviewed. Constitutional:       General: He is not in acute distress. Appearance: Normal appearance. He is normal weight. He is not ill-appearing, toxic-appearing or diaphoretic. HENT:      Head: Normocephalic and atraumatic. Right Ear: External ear normal.      Left Ear: External ear normal.      Nose: Nose normal. No congestion or rhinorrhea. Mouth/Throat:      Mouth: Mucous membranes are moist.   Eyes:      Extraocular Movements: Extraocular movements intact. Conjunctiva/sclera: Conjunctivae normal.      Pupils: Pupils are equal, round, and reactive to light. Neck:      Vascular: No carotid bruit. Cardiovascular:      Rate and Rhythm: Normal rate and regular rhythm. Pulses: Normal pulses.       Heart sounds: Normal heart sounds. No murmur heard. No friction rub. No gallop. Pulmonary:      Effort: Pulmonary effort is normal. No respiratory distress. Breath sounds: Normal breath sounds. No stridor. No wheezing, rhonchi or rales. Chest:      Chest wall: No tenderness. Abdominal:      General: Abdomen is flat. There is no distension. Palpations: Abdomen is soft. There is no mass. Tenderness: There is no abdominal tenderness. There is no right CVA tenderness, left CVA tenderness, guarding or rebound. Hernia: No hernia is present. Musculoskeletal:         General: Tenderness present. No swelling, deformity or signs of injury. Normal range of motion. Cervical back: Normal range of motion and neck supple. No rigidity or tenderness. Thoracic back: Normal.      Lumbar back: Tenderness present. No swelling, edema, deformity, signs of trauma, lacerations, spasms or bony tenderness. Normal range of motion. Negative right straight leg raise test and negative left straight leg raise test.        Back:       Right lower leg: No edema. Left lower leg: No edema. Comments: L4, L5, S1 tenderness   Lymphadenopathy:      Cervical: No cervical adenopathy. Skin:     General: Skin is warm and dry. Capillary Refill: Capillary refill takes 2 to 3 seconds. Coloration: Skin is not jaundiced or pale. Findings: No bruising, erythema, lesion or rash. Neurological:      General: No focal deficit present. Mental Status: He is alert and oriented to person, place, and time. Cranial Nerves: No cranial nerve deficit. Sensory: No sensory deficit. Motor: No weakness. Coordination: Coordination normal.      Gait: Gait abnormal (Antalgic). Psychiatric:         Mood and Affect: Mood normal.         Behavior: Behavior normal.         Thought Content:  Thought content normal.         Judgment: Judgment normal.        Procedures    Results for orders placed or performed during the hospital encounter of 10/06/22   XR LUMBAR SPINE (2-3 VIEWS)    Narrative    EXAMINATION: XR LUMBAR SPINE (2-3 VIEWS) 10/6/2022 10:54 AM    ACCESSION NUMBER: [id number]    COMPARISON: None available    INDICATION: back injury    TECHNIQUE: 3 views of the lumbar spine were obtained. FINDINGS:   Vertebral body height is within normal limits. Lumbar spine alignment is normal.  No acute fracture. No significant degenerative changes. 15 mm calcification overlies the right  renal silhouette. Impression    1. No acute fracture or malalignment. 2.  Suspect right renal calculus. CT ABDOMEN PELVIS RENAL STONE Additional Contrast? None    Narrative    EXAMINATION: CT ABDOMEN PELVIS RENAL STONE 10/6/2022 12:53 PM    ACCESSION NUMBER: [id number]    COMPARISON: None available    INDICATION: back pain, renal calculus on lumbar spine. TECHNIQUE: Contiguous axial computed tomographic images were obtained from the  upper abdomen to the symphysis pubis without intravenous contrast. Coronal  reconstructions were also performed. Please note that the detection of solid organ and vascular abnormalities is  limited in the absence of intravenous contrast.    Radiation dose reduction techniques were used for this study. Our CT scanners  use one or all of the following: Automated exposure control, adjustment of the  mA and/or kV according to patient size, iterative reconstruction. FINDINGS:  LUNG BASES: No airspace consolidation within the lung bases. No sizable pleural  effusion. The heart is not enlarged. LIVER: The liver contour is normal. The visualized portions of the hepatic  parenchyma are unremarkable. BILIARY TREE: The gallbladder is within normal limits. No biliary dilation. SPLEEN: Normal.    PANCREAS: No pancreatic mass or ductal dilation. ADRENALS: Normal.    KIDNEYS/BLADDER: The kidneys are symmetric in size. No renal calculus or  hydronephrosis. No renal mass. The urinary bladder is unremarkable. BOWEL: The colon is unremarkable. The small bowel is normal in caliber. No bowel  wall thickening. APPENDIX: The appendix is normal.    PERITONEUM/RETROPERITONEUM: No ascites or free air. No pelvic or retroperitoneal  lymphadenopathy. VESSELS: No abdominal aortic aneurysm. ABDOMINAL WALL: No hernia or mass. REPRODUCTIVE: The prostate gland is normal in size. BONES: No suspicious osseous lesion. Posterior disc bulging at L5-S1. Impression    1. No acute abnormality within the abdomen and pelvis. 2.  Small posterior disc bulging at L5-S1.        CBC with Auto Differential   Result Value Ref Range    WBC 5.1 4.3 - 11.1 K/uL    RBC 4.76 4.23 - 5.6 M/uL    Hemoglobin 13.7 13.6 - 17.2 g/dL    Hematocrit 42.4 41.1 - 50.3 %    MCV 89.1 79.6 - 97.8 FL    MCH 28.8 26.1 - 32.9 PG    MCHC 32.3 31.4 - 35.0 g/dL    RDW 12.7 11.9 - 14.6 %    Platelets 194 016 - 102 K/uL    MPV 8.9 (L) 9.4 - 12.3 FL    nRBC 0.00 0.0 - 0.2 K/uL    Differential Type AUTOMATED      Seg Neutrophils 51 43 - 78 %    Lymphocytes 37 13 - 44 %    Monocytes 8 4.0 - 12.0 %    Eosinophils % 3 0.5 - 7.8 %    Basophils 1 0.0 - 2.0 %    Immature Granulocytes 0 0.0 - 5.0 %    Segs Absolute 2.7 1.7 - 8.2 K/UL    Absolute Lymph # 1.9 0.5 - 4.6 K/UL    Absolute Mono # 0.4 0.1 - 1.3 K/UL    Absolute Eos # 0.1 0.0 - 0.8 K/UL    Basophils Absolute 0.1 0.0 - 0.2 K/UL    Absolute Immature Granulocyte 0.0 0.0 - 0.5 K/UL   Comprehensive Metabolic Panel   Result Value Ref Range    Sodium 138 138 - 145 mmol/L    Potassium 3.9 3.5 - 5.1 mmol/L    Chloride 109 101 - 110 mmol/L    CO2 25 21 - 32 mmol/L    Anion Gap 4 4 - 13 mmol/L    Glucose 116 (H) 65 - 100 mg/dL    BUN 20 6 - 23 MG/DL    Creatinine 1.00 0.8 - 1.5 MG/DL    Est, Glom Filt Rate >60 >60 ml/min/1.73m2    Calcium 9.0 8.3 - 10.4 MG/DL    Total Bilirubin 0.5 0.2 - 1.1 MG/DL    ALT 47 12 - 65 U/L    AST 20 15 - 37 U/L    Alk Phosphatase 94 50 - 136 U/L Total Protein 7.3 6.3 - 8.2 g/dL    Albumin 4.3 3.5 - 5.0 g/dL    Globulin 3.0 2.3 - 3.5 g/dL    Albumin/Globulin Ratio 1.4 1.2 - 3.5     POCT Urinalysis no Micro   Result Value Ref Range    Specific Gravity, Urine, POC 1.025 (H) 1.001 - 1.023      pH, Urine, POC 6.0 5.0 - 9.0      Protein, Urine, POC Negative NEG mg/dL    Glucose, UA POC Negative NEG mg/dL    Ketones, Urine, POC Negative NEG mg/dL    Bilirubin, Urine, POC Negative NEG      Blood, UA POC Negative NEG      URINE UROBILINOGEN POC 0.2 0.2 - 1.0 EU/dL    Nitrate, Urine, POC Negative NEG      Leukocyte Est, UA POC Negative NEG      Performed by: Cortez Chamberlain         CT ABDOMEN PELVIS RENAL STONE Additional Contrast? None   Final Result   1. No acute abnormality within the abdomen and pelvis. 2.  Small posterior disc bulging at L5-S1. XR LUMBAR SPINE (2-3 VIEWS)   Final Result   1. No acute fracture or malalignment. 2.  Suspect right renal calculus. Voice dictation software was used during the making of this note. This software is not perfect and grammatical and other typographical errors may be present. This note has not been completely proofread for errors.      Aida Sanchez, SHANNON - CNP  10/06/22 [id number]

## 2022-10-06 NOTE — ED TRIAGE NOTES
Patient ambulatory to triage with mask in place. Patient reports back pain that started yesterday when moving some granite countertops.

## 2022-10-06 NOTE — ED NOTES
I have reviewed discharge instructions with the patient. The patient verbalized understanding. Patient left ED via Discharge Method: ambulatory to Home with self. Opportunity for questions and clarification provided. Patient given 2 scripts. To continue your aftercare when you leave the hospital, you may receive an automated call from our care team to check in on how you are doing. This is a free service and part of our promise to provide the best care and service to meet your aftercare needs.  If you have questions, or wish to unsubscribe from this service please call [phone]. Thank you for Choosing our [facility] Emergency Department.         Cindy Joiner RN  10/06/22 4885

## 2022-10-06 NOTE — DISCHARGE INSTRUCTIONS
Please take the muscle relaxer as needed. Do not drink alcohol or drive a car while taking this medication. Also take the ibuprofen as needed for pain. With any increased pain or other concern.

## 2024-02-03 ENCOUNTER — HOSPITAL ENCOUNTER (EMERGENCY)
Age: 39
Discharge: HOME | End: 2024-02-03
Payer: MEDICAID

## 2024-02-03 VITALS
HEART RATE: 81 BPM | RESPIRATION RATE: 18 BRPM | OXYGEN SATURATION: 97 % | SYSTOLIC BLOOD PRESSURE: 132 MMHG | DIASTOLIC BLOOD PRESSURE: 82 MMHG | TEMPERATURE: 97.7 F

## 2024-02-03 DIAGNOSIS — M54.50 ACUTE MIDLINE LOW BACK PAIN WITHOUT SCIATICA: Primary | ICD-10-CM

## 2024-02-03 PROCEDURE — 96372 THER/PROPH/DIAG INJ SC/IM: CPT

## 2024-02-03 PROCEDURE — 99284 EMERGENCY DEPT VISIT MOD MDM: CPT

## 2024-02-03 PROCEDURE — 6360000002 HC RX W HCPCS: Performed by: PHYSICIAN ASSISTANT

## 2024-02-03 RX ORDER — KETOROLAC TROMETHAMINE 30 MG/ML
30 INJECTION, SOLUTION INTRAMUSCULAR; INTRAVENOUS
Status: COMPLETED | OUTPATIENT
Start: 2024-02-03 | End: 2024-02-03

## 2024-02-03 RX ORDER — CYCLOBENZAPRINE HCL 10 MG
10 TABLET ORAL 3 TIMES DAILY PRN
Qty: 21 TABLET | Refills: 0 | Status: SHIPPED | OUTPATIENT
Start: 2024-02-03 | End: 2024-02-13

## 2024-02-03 RX ADMIN — KETOROLAC TROMETHAMINE 30 MG: 30 INJECTION, SOLUTION INTRAMUSCULAR; INTRAVENOUS at 09:35

## 2024-02-03 ASSESSMENT — PAIN DESCRIPTION - ORIENTATION: ORIENTATION: LOWER

## 2024-02-03 ASSESSMENT — PAIN DESCRIPTION - LOCATION: LOCATION: BACK

## 2024-02-03 ASSESSMENT — LIFESTYLE VARIABLES
HOW MANY STANDARD DRINKS CONTAINING ALCOHOL DO YOU HAVE ON A TYPICAL DAY: PATIENT DOES NOT DRINK
HOW OFTEN DO YOU HAVE A DRINK CONTAINING ALCOHOL: NEVER

## 2024-02-03 ASSESSMENT — PAIN SCALES - GENERAL: PAINLEVEL_OUTOF10: 10

## 2024-02-03 ASSESSMENT — PAIN DESCRIPTION - DESCRIPTORS: DESCRIPTORS: ACHING

## 2024-02-03 NOTE — DISCHARGE INSTRUCTIONS
Use meds as directed along with moist heat or heating pad, do stretches as seen on hand outs, see your primary md for recheck
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
## 2024-02-03 NOTE — ED NOTES
I have reviewed discharge instructions with the patient.  The patient verbalized understanding.    Patient left ED via Discharge Method: ambulatory to Home with family.    Opportunity for questions and clarification provided.       Patient given 1 scripts.         To continue your aftercare when you leave the hospital, you may receive an automated call from our care team to check in on how you are doing.  This is a free service and part of our promise to provide the best care and service to meet your aftercare needs.” If you have questions, or wish to unsubscribe from this service please call [phone].  Thank you for Choosing our [facility] Emergency Department.        Rupali Osman RN  02/03/24 1011

## 2024-04-09 ENCOUNTER — HOSPITAL ENCOUNTER (EMERGENCY)
Dept: ULTRASOUND IMAGING | Age: 39
Discharge: HOME | End: 2024-04-12
Payer: MEDICAID

## 2024-04-09 ENCOUNTER — HOSPITAL ENCOUNTER (EMERGENCY)
Age: 39
Discharge: HOME | End: 2024-04-09
Payer: MEDICAID

## 2024-04-09 VITALS
BODY MASS INDEX: 37.03 KG/M2 | RESPIRATION RATE: 18 BRPM | HEART RATE: 72 BPM | TEMPERATURE: 98 F | HEIGHT: 69 IN | SYSTOLIC BLOOD PRESSURE: 134 MMHG | OXYGEN SATURATION: 97 % | DIASTOLIC BLOOD PRESSURE: 93 MMHG | WEIGHT: 250 LBS

## 2024-04-09 DIAGNOSIS — Z80.8 FAMILY HISTORY OF THYROID CANCER: ICD-10-CM

## 2024-04-09 DIAGNOSIS — N50.89 TESTICULAR MASS: Primary | ICD-10-CM

## 2024-04-09 LAB
BACTERIA URNS QL MICRO: NEGATIVE /HPF
BILIRUB UR QL: NEGATIVE
CASTS URNS QL MICRO: NORMAL /LPF
EPI CELLS #/AREA URNS HPF: NORMAL /HPF
GLUCOSE UR QL STRIP.AUTO: NEGATIVE MG/DL
KETONES UR-MCNC: NEGATIVE MG/DL
LEUKOCYTE ESTERASE UR QL STRIP: NEGATIVE
NITRITE UR QL: NEGATIVE
PH UR: 5.5 (ref 5–9)
PROT UR QL: NEGATIVE MG/DL
RBC # UR STRIP: NEGATIVE
RBC #/AREA URNS HPF: NORMAL /HPF
SERVICE CMNT-IMP: ABNORMAL
SP GR UR: 1.01 (ref 1–1.02)
UROBILINOGEN UR QL: 2 EU/DL (ref 0.2–1)
WBC URNS QL MICRO: NORMAL /HPF

## 2024-04-09 PROCEDURE — 99284 EMERGENCY DEPT VISIT MOD MDM: CPT

## 2024-04-09 PROCEDURE — 87491 CHLMYD TRACH DNA AMP PROBE: CPT

## 2024-04-09 PROCEDURE — 6370000000 HC RX 637 (ALT 250 FOR IP): Performed by: PHYSICIAN ASSISTANT

## 2024-04-09 PROCEDURE — 76870 US EXAM SCROTUM: CPT

## 2024-04-09 PROCEDURE — 87591 N.GONORRHOEAE DNA AMP PROB: CPT

## 2024-04-09 PROCEDURE — 81003 URINALYSIS AUTO W/O SCOPE: CPT

## 2024-04-09 PROCEDURE — 81001 URINALYSIS AUTO W/SCOPE: CPT

## 2024-04-09 PROCEDURE — 81015 MICROSCOPIC EXAM OF URINE: CPT

## 2024-04-09 RX ORDER — HYDROCODONE BITARTRATE AND ACETAMINOPHEN 5; 325 MG/1; MG/1
1 TABLET ORAL EVERY 4 HOURS PRN
Qty: 18 TABLET | Refills: 0 | Status: SHIPPED | OUTPATIENT
Start: 2024-04-09 | End: 2024-04-12

## 2024-04-09 RX ORDER — IBUPROFEN 600 MG/1
600 TABLET ORAL
Status: COMPLETED | OUTPATIENT
Start: 2024-04-09 | End: 2024-04-09

## 2024-04-09 RX ADMIN — IBUPROFEN 600 MG: 600 TABLET, FILM COATED ORAL at 21:01

## 2024-04-09 ASSESSMENT — PAIN - FUNCTIONAL ASSESSMENT: PAIN_FUNCTIONAL_ASSESSMENT: 0-10

## 2024-04-09 ASSESSMENT — PAIN SCALES - GENERAL: PAINLEVEL_OUTOF10: 8

## 2024-04-09 ASSESSMENT — PAIN DESCRIPTION - LOCATION: LOCATION: SCROTUM

## 2024-04-09 ASSESSMENT — PAIN DESCRIPTION - DESCRIPTORS: DESCRIPTORS: ACHING

## 2024-04-10 NOTE — ED NOTES
Patient mobility status  with no difficulty. Provider aware     I have reviewed discharge instructions with the patient.  The patient verbalized understanding.    Patient left ED via Discharge Method: ambulatory to Home with Extended Family:.    Opportunity for questions and clarification provided.     Patient given 1 scripts.            Sylvia, Ita, RN  04/09/24 8806

## 2024-04-10 NOTE — ED TRIAGE NOTES
Pt reports L testicle pain and swelling for the last 3 weeks. Pt reports he went to  today and was told to come to the ED for US. Pt reports soreness and difficulty ambulating. Denies any urinary complaints. Reports it appears purple to him.

## 2024-04-10 NOTE — ED PROVIDER NOTES
toxic-appearing or diaphoretic.   HENT:      Head: Normocephalic and atraumatic.      Nose: Nose normal.      Mouth/Throat:      Mouth: Mucous membranes are moist.   Eyes:      Pupils: Pupils are equal, round, and reactive to light.   Cardiovascular:      Rate and Rhythm: Normal rate.   Pulmonary:      Effort: Pulmonary effort is normal. No respiratory distress.   Abdominal:      General: Abdomen is flat.      Palpations: Abdomen is soft.      Tenderness: There is no abdominal tenderness.   Genitourinary:     Penis: Circumcised. No paraphimosis, erythema or discharge.       Testes:         Left: Tenderness and swelling present.      Epididymis:      Left: Inflamed. Tenderness present.      Comments: There is no discoloration present.  Musculoskeletal:         General: Normal range of motion.      Cervical back: Normal range of motion. No rigidity.   Skin:     General: Skin is warm.   Neurological:      General: No focal deficit present.      Mental Status: He is alert.   Psychiatric:         Mood and Affect: Mood normal.        Procedures     Procedures    Orders Placed This Encounter   Procedures    C.trachomatis N.gonorrhoeae DNA    US SCROTUM AND TESTICLES    Urinalysis, Micro    POCT Urine Dipstick    POCT Urinalysis no Micro        Medications given during this emergency department visit:  Medications   ibuprofen (ADVIL;MOTRIN) tablet 600 mg (600 mg Oral Given 4/9/24 2101)       New Prescriptions    HYDROCODONE-ACETAMINOPHEN (LORCET) 5-325 MG PER TABLET    Take 1 tablet by mouth every 4 hours as needed for Pain for up to 3 days. Intended supply: 3 days. Take lowest dose possible to manage pain Max Daily Amount: 6 tablets        History reviewed. No pertinent past medical history.     History reviewed. No pertinent surgical history.     Social History     Socioeconomic History    Marital status:      Spouse name: None    Number of children: None    Years of education: None    Highest education level: None

## 2024-04-10 NOTE — DISCHARGE INSTRUCTIONS
As I discussed with you, your workup today reveals findings that are concerning for a testicular cancer.  While different causes of your testicular pain and swelling are possible, given your family history cancer is of higher concern.    You need to be seen by the urologist.  They will be calling you in the next 1 to 3 days to schedule this appointment.  It is absolutely imperative that you follow-up with them for further testing.    Use the pain medications for pain control.

## 2024-04-12 LAB
C TRACH RRNA SPEC QL NAA+PROBE: NEGATIVE
N GONORRHOEA RRNA SPEC QL NAA+PROBE: NEGATIVE
SPECIMEN SOURCE: NORMAL

## 2024-04-15 ENCOUNTER — OFFICE VISIT (OUTPATIENT)
Dept: UROLOGY | Age: 39
End: 2024-04-15
Payer: MEDICAID

## 2024-04-15 DIAGNOSIS — N50.812 TESTICULAR PAIN, LEFT: Primary | ICD-10-CM

## 2024-04-15 DIAGNOSIS — N50.89 TESTICULAR MASS: ICD-10-CM

## 2024-04-15 LAB
BILIRUBIN, URINE, POC: NEGATIVE
BLOOD URINE, POC: NEGATIVE
GLUCOSE URINE, POC: NEGATIVE
KETONES, URINE, POC: NEGATIVE
LDH SERPL L TO P-CCNC: 228 U/L (ref 100–190)
LEUKOCYTE ESTERASE, URINE, POC: NEGATIVE
NITRITE, URINE, POC: NEGATIVE
PH, URINE, POC: 6.5 (ref 4.6–8)
PROTEIN,URINE, POC: NEGATIVE
SPECIFIC GRAVITY, URINE, POC: 1.03 (ref 1–1.03)
URINALYSIS CLARITY, POC: NORMAL
URINALYSIS COLOR, POC: NORMAL
UROBILINOGEN, POC: NORMAL

## 2024-04-15 PROCEDURE — 81003 URINALYSIS AUTO W/O SCOPE: CPT | Performed by: UROLOGY

## 2024-04-15 PROCEDURE — 99244 OFF/OP CNSLTJ NEW/EST MOD 40: CPT | Performed by: UROLOGY

## 2024-04-15 ASSESSMENT — ENCOUNTER SYMPTOMS
NAUSEA: 0
VOMITING: 0
SKIN LESIONS: 0
CONSTIPATION: 0
EYE DISCHARGE: 0
EYE PAIN: 0
ABDOMINAL PAIN: 0
WHEEZING: 0
BLOOD IN STOOL: 0
HEARTBURN: 0
DIARRHEA: 0
COUGH: 0
SHORTNESS OF BREATH: 0
INDIGESTION: 0
BACK PAIN: 0

## 2024-04-15 NOTE — PROGRESS NOTES
[facility]  [address] [place] [address]  [phone]    José Miguel Pandey  : 1985      Chief Complaint   Patient presents with    Testicle Pain        HPI   38 y.o., male who is referred by Dr. Pantoja for evaluation of a L testicular mass.  Pt reports progressive enlargement of his L testis over the past yr.  Previously seen at [facility].  Reports two wk history of pain.  CYNTHIA on 24 shows an enlarged L testis (5.6cm) with a 2.7cm inf pole mass concerning for malignancy.  Denies any wt loss or fevers.  No recent trauma.  No FH of CaT.  No prior scrotal or inguinal surgery.  3 kids.      Past Medical History:   Diagnosis Date    High cholesterol      No past surgical history on file.  Current Outpatient Medications   Medication Sig Dispense Refill    ibuprofen (ADVIL;MOTRIN) 600 MG tablet Take 1 tablet by mouth 3 times daily as needed for Pain 21 tablet 0    ondansetron (ZOFRAN-ODT) 4 MG disintegrating tablet Take 1 tablet by mouth every 8 hours as needed (Patient not taking: Reported on 4/15/2024)       No current facility-administered medications for this visit.     No Known Allergies  Social History     Socioeconomic History    Marital status:      Spouse name: Not on file    Number of children: Not on file    Years of education: Not on file    Highest education level: Not on file   Occupational History    Not on file   Tobacco Use    Smoking status: Never    Smokeless tobacco: Never   Vaping Use    Vaping Use: Never used   Substance and Sexual Activity    Alcohol use: Not Currently    Drug use: Never    Sexual activity: Defer   Other Topics Concern    Not on file   Social History Narrative    Not on file     Social Determinants of Health     Financial Resource Strain: Not on file   Food Insecurity: Not on file   Transportation Needs: Not on file   Physical Activity: Not on file   Stress: Not on file   Social Connections: Not on file   Intimate Partner Violence: Not on

## 2024-04-16 ENCOUNTER — HOSPITAL ENCOUNTER (OUTPATIENT)
Dept: CT IMAGING | Age: 39
Discharge: HOME | End: 2024-04-19
Attending: UROLOGY
Payer: MEDICAID

## 2024-04-16 ENCOUNTER — TELEPHONE (OUTPATIENT)
Dept: UROLOGY | Age: 39
End: 2024-04-16

## 2024-04-16 DIAGNOSIS — N50.89 TESTICULAR MASS: ICD-10-CM

## 2024-04-16 LAB — AFP-TM SERPL-MCNC: 2.9 NG/ML

## 2024-04-16 PROCEDURE — 6360000004 HC RX CONTRAST MEDICATION: Performed by: UROLOGY

## 2024-04-16 PROCEDURE — 71260 CT THORAX DX C+: CPT

## 2024-04-16 RX ADMIN — DIATRIZOATE MEGLUMINE AND DIATRIZOATE SODIUM 15 ML: 660; 100 LIQUID ORAL; RECTAL at 14:32

## 2024-04-16 RX ADMIN — IOPAMIDOL 100 ML: 755 INJECTION, SOLUTION INTRAVENOUS at 14:32

## 2024-04-16 NOTE — TELEPHONE ENCOUNTER
----- Message from Luis Rojas DO sent at 4/15/2024  3:18 PM EDT -----  Regarding: surg  LEFT radical orchiectomy  Cbc, bmp  Ancef 2g IV preop  Outpt  1h  Gen  soon

## 2024-04-17 ENCOUNTER — PREP FOR PROCEDURE (OUTPATIENT)
Dept: UROLOGY | Age: 39
End: 2024-04-17

## 2024-04-17 PROBLEM — N50.89 TESTICULAR MASS: Status: ACTIVE | Noted: 2024-04-16

## 2024-04-17 NOTE — TELEPHONE ENCOUNTER
Procedures: Procedure(s):   ORCHIECTOMY RADICAL/ LEFT   Date: 4/19/2024   Time: 1330   Location:  MAIN OR 03

## 2024-04-17 NOTE — PROGRESS NOTES
Patient verified name and .  Order for consent not found in EHR and matches case posting; patient verifies procedure.   Type 2 surgery, phone assessment complete.  Orders not received.  Labs per surgeon: cbc,bmp in notes 24  Labs per anesthesia protocol: hgb  Patient answered medical/surgical history questions at their best of ability. All prior to admission medications documented in EPIC.    Patient instructed to continue taking all prescription medications up to the day of surgery but to take only the following medications the day of surgery according to anesthesia guidelines with a small sip of water: none Also, patient is requested to take 2 Tylenol in the morning and then again before bed on the day before surgery. Regular or extra strength may be used.       Patient informed that all vitamins and supplements should be held 7 days prior to surgery and NSAIDS 5 days prior to surgery. Prescription meds to hold:none    Patient instructed on the following:    > Arrive at Main Entrance, time of arrival to be called the day before by 1700  > NPO after midnight, unless otherwise indicated, including gum, mints, and ice chips  > Responsible adult must drive patient to the hospital, stay during surgery, and patient will need supervision 24 hours after anesthesia  > Use non moisturizing soap in shower the night before surgery and on the morning of surgery  > All piercings must be removed prior to arrival.    > Leave all valuables (money and jewelry) at home but bring insurance card and ID on DOS.   > You may be required to pay a deductible or co-pay on the day of your procedure. You can pre-pay by calling [phone] if your surgery is at the [facility] or [phone] if your surgery is at the [facility].  > Do not wear make-up, nail polish, lotions, cologne, perfumes, powders, or oil on skin. Artificial nails are not permitted.

## 2024-04-18 ENCOUNTER — ANESTHESIA EVENT (OUTPATIENT)
Dept: SURGERY | Age: 39
End: 2024-04-18
Payer: MEDICAID

## 2024-04-18 LAB — HCG INTACT+B SERPL-ACNC: 16 MIU/ML (ref 0–3)

## 2024-04-19 ENCOUNTER — TELEPHONE (OUTPATIENT)
Dept: UROLOGY | Age: 39
End: 2024-04-19

## 2024-04-19 ENCOUNTER — ANESTHESIA (OUTPATIENT)
Dept: SURGERY | Age: 39
End: 2024-04-19
Payer: MEDICAID

## 2024-04-19 ENCOUNTER — HOSPITAL ENCOUNTER (OUTPATIENT)
Age: 39
Setting detail: OUTPATIENT SURGERY
Discharge: HOME | End: 2024-04-19
Attending: UROLOGY | Admitting: UROLOGY
Payer: MEDICAID

## 2024-04-19 VITALS
BODY MASS INDEX: 36.14 KG/M2 | OXYGEN SATURATION: 98 % | TEMPERATURE: 97.9 F | SYSTOLIC BLOOD PRESSURE: 125 MMHG | HEIGHT: 69 IN | HEART RATE: 68 BPM | WEIGHT: 244 LBS | DIASTOLIC BLOOD PRESSURE: 70 MMHG | RESPIRATION RATE: 16 BRPM

## 2024-04-19 DIAGNOSIS — N50.89 TESTICULAR MASS: Primary | ICD-10-CM

## 2024-04-19 LAB
ANION GAP SERPL CALC-SCNC: 4 MMOL/L (ref 2–11)
BUN SERPL-MCNC: 14 MG/DL (ref 6–23)
CALCIUM SERPL-MCNC: 9.3 MG/DL (ref 8.3–10.4)
CHLORIDE SERPL-SCNC: 108 MMOL/L (ref 103–113)
CO2 SERPL-SCNC: 25 MMOL/L (ref 21–32)
CREAT SERPL-MCNC: 1.1 MG/DL (ref 0.8–1.5)
ERYTHROCYTE [DISTWIDTH] IN BLOOD BY AUTOMATED COUNT: 12.3 % (ref 11.9–14.6)
GLUCOSE SERPL-MCNC: 121 MG/DL (ref 65–100)
HCT VFR BLD AUTO: 45.4 % (ref 41.1–50.3)
HGB BLD-MCNC: 14.7 G/DL (ref 13.6–17.2)
MCH RBC QN AUTO: 28.9 PG (ref 26.1–32.9)
MCHC RBC AUTO-ENTMCNC: 32.4 G/DL (ref 31.4–35)
MCV RBC AUTO: 89.4 FL (ref 82–102)
NRBC # BLD: 0 K/UL (ref 0–0.2)
PLATELET # BLD AUTO: 286 K/UL (ref 150–450)
PMV BLD AUTO: 8.7 FL (ref 9.4–12.3)
POTASSIUM SERPL-SCNC: 3.9 MMOL/L (ref 3.5–5.1)
RBC # BLD AUTO: 5.08 M/UL (ref 4.23–5.6)
SODIUM SERPL-SCNC: 137 MMOL/L (ref 136–146)
WBC # BLD AUTO: 6.3 K/UL (ref 4.3–11.1)

## 2024-04-19 PROCEDURE — 7100000001 HC PACU RECOVERY - ADDTL 15 MIN: Performed by: UROLOGY

## 2024-04-19 PROCEDURE — 2500000003 HC RX 250 WO HCPCS: Performed by: NURSE ANESTHETIST, CERTIFIED REGISTERED

## 2024-04-19 PROCEDURE — 6360000002 HC RX W HCPCS: Performed by: UROLOGY

## 2024-04-19 PROCEDURE — 88305 TISSUE EXAM BY PATHOLOGIST: CPT

## 2024-04-19 PROCEDURE — 54530 REMOVAL OF TESTIS: CPT | Performed by: UROLOGY

## 2024-04-19 PROCEDURE — 7100000000 HC PACU RECOVERY - FIRST 15 MIN: Performed by: UROLOGY

## 2024-04-19 PROCEDURE — 7100000011 HC PHASE II RECOVERY - ADDTL 15 MIN: Performed by: UROLOGY

## 2024-04-19 PROCEDURE — 3600000012 HC SURGERY LEVEL 2 ADDTL 15MIN: Performed by: UROLOGY

## 2024-04-19 PROCEDURE — 85027 COMPLETE CBC AUTOMATED: CPT

## 2024-04-19 PROCEDURE — 7100000010 HC PHASE II RECOVERY - FIRST 15 MIN: Performed by: UROLOGY

## 2024-04-19 PROCEDURE — 3700000001 HC ADD 15 MINUTES (ANESTHESIA): Performed by: UROLOGY

## 2024-04-19 PROCEDURE — 80048 BASIC METABOLIC PNL TOTAL CA: CPT

## 2024-04-19 PROCEDURE — 3700000000 HC ANESTHESIA ATTENDED CARE: Performed by: UROLOGY

## 2024-04-19 PROCEDURE — 6370000000 HC RX 637 (ALT 250 FOR IP): Performed by: ANESTHESIOLOGY

## 2024-04-19 PROCEDURE — 2709999900 HC NON-CHARGEABLE SUPPLY: Performed by: UROLOGY

## 2024-04-19 PROCEDURE — 6360000002 HC RX W HCPCS: Performed by: NURSE ANESTHETIST, CERTIFIED REGISTERED

## 2024-04-19 PROCEDURE — 88309 TISSUE EXAM BY PATHOLOGIST: CPT

## 2024-04-19 PROCEDURE — 3600000002 HC SURGERY LEVEL 2 BASE: Performed by: UROLOGY

## 2024-04-19 PROCEDURE — 2580000003 HC RX 258: Performed by: ANESTHESIOLOGY

## 2024-04-19 RX ORDER — SODIUM CHLORIDE 0.9 % (FLUSH) 0.9 %
5-40 SYRINGE (ML) INJECTION EVERY 12 HOURS SCHEDULED
Status: DISCONTINUED | OUTPATIENT
Start: 2024-04-19 | End: 2024-04-19 | Stop reason: HOSPADM

## 2024-04-19 RX ORDER — FENTANYL CITRATE 50 UG/ML
INJECTION, SOLUTION INTRAMUSCULAR; INTRAVENOUS PRN
Status: DISCONTINUED | OUTPATIENT
Start: 2024-04-19 | End: 2024-04-19 | Stop reason: SDUPTHER

## 2024-04-19 RX ORDER — LIDOCAINE HYDROCHLORIDE 20 MG/ML
INJECTION, SOLUTION EPIDURAL; INFILTRATION; INTRACAUDAL; PERINEURAL PRN
Status: DISCONTINUED | OUTPATIENT
Start: 2024-04-19 | End: 2024-04-19 | Stop reason: SDUPTHER

## 2024-04-19 RX ORDER — MIDAZOLAM HYDROCHLORIDE 2 MG/2ML
2 INJECTION, SOLUTION INTRAMUSCULAR; INTRAVENOUS
Status: DISCONTINUED | OUTPATIENT
Start: 2024-04-19 | End: 2024-04-19 | Stop reason: HOSPADM

## 2024-04-19 RX ORDER — PROCHLORPERAZINE EDISYLATE 5 MG/ML
5 INJECTION INTRAMUSCULAR; INTRAVENOUS
Status: DISCONTINUED | OUTPATIENT
Start: 2024-04-19 | End: 2024-04-19 | Stop reason: HOSPADM

## 2024-04-19 RX ORDER — PROPOFOL 10 MG/ML
INJECTION, EMULSION INTRAVENOUS PRN
Status: DISCONTINUED | OUTPATIENT
Start: 2024-04-19 | End: 2024-04-19 | Stop reason: SDUPTHER

## 2024-04-19 RX ORDER — SODIUM CHLORIDE 9 MG/ML
INJECTION, SOLUTION INTRAVENOUS PRN
Status: DISCONTINUED | OUTPATIENT
Start: 2024-04-19 | End: 2024-04-19 | Stop reason: HOSPADM

## 2024-04-19 RX ORDER — OXYCODONE HYDROCHLORIDE 5 MG/1
5 TABLET ORAL PRN
Status: COMPLETED | OUTPATIENT
Start: 2024-04-19 | End: 2024-04-19

## 2024-04-19 RX ORDER — LIDOCAINE HYDROCHLORIDE 10 MG/ML
1 INJECTION, SOLUTION INFILTRATION; PERINEURAL
Status: DISCONTINUED | OUTPATIENT
Start: 2024-04-19 | End: 2024-04-19 | Stop reason: HOSPADM

## 2024-04-19 RX ORDER — ONDANSETRON 2 MG/ML
4 INJECTION INTRAMUSCULAR; INTRAVENOUS
Status: DISCONTINUED | OUTPATIENT
Start: 2024-04-19 | End: 2024-04-19 | Stop reason: HOSPADM

## 2024-04-19 RX ORDER — DEXAMETHASONE SODIUM PHOSPHATE 4 MG/ML
INJECTION, SOLUTION INTRA-ARTICULAR; INTRALESIONAL; INTRAMUSCULAR; INTRAVENOUS; SOFT TISSUE PRN
Status: DISCONTINUED | OUTPATIENT
Start: 2024-04-19 | End: 2024-04-19 | Stop reason: SDUPTHER

## 2024-04-19 RX ORDER — SODIUM CHLORIDE, SODIUM LACTATE, POTASSIUM CHLORIDE, CALCIUM CHLORIDE 600; 310; 30; 20 MG/100ML; MG/100ML; MG/100ML; MG/100ML
INJECTION, SOLUTION INTRAVENOUS CONTINUOUS
Status: DISCONTINUED | OUTPATIENT
Start: 2024-04-19 | End: 2024-04-19 | Stop reason: HOSPADM

## 2024-04-19 RX ORDER — NALOXONE HYDROCHLORIDE 0.4 MG/ML
INJECTION, SOLUTION INTRAMUSCULAR; INTRAVENOUS; SUBCUTANEOUS PRN
Status: DISCONTINUED | OUTPATIENT
Start: 2024-04-19 | End: 2024-04-19 | Stop reason: HOSPADM

## 2024-04-19 RX ORDER — ONDANSETRON 2 MG/ML
INJECTION INTRAMUSCULAR; INTRAVENOUS PRN
Status: DISCONTINUED | OUTPATIENT
Start: 2024-04-19 | End: 2024-04-19 | Stop reason: SDUPTHER

## 2024-04-19 RX ORDER — HYDROMORPHONE HYDROCHLORIDE 2 MG/ML
0.25 INJECTION, SOLUTION INTRAMUSCULAR; INTRAVENOUS; SUBCUTANEOUS EVERY 5 MIN PRN
Status: DISCONTINUED | OUTPATIENT
Start: 2024-04-19 | End: 2024-04-19 | Stop reason: HOSPADM

## 2024-04-19 RX ORDER — OXYCODONE HYDROCHLORIDE 5 MG/1
10 TABLET ORAL PRN
Status: COMPLETED | OUTPATIENT
Start: 2024-04-19 | End: 2024-04-19

## 2024-04-19 RX ORDER — KETOROLAC TROMETHAMINE 30 MG/ML
INJECTION, SOLUTION INTRAMUSCULAR; INTRAVENOUS PRN
Status: DISCONTINUED | OUTPATIENT
Start: 2024-04-19 | End: 2024-04-19 | Stop reason: SDUPTHER

## 2024-04-19 RX ORDER — HYDROMORPHONE HYDROCHLORIDE 2 MG/ML
INJECTION, SOLUTION INTRAMUSCULAR; INTRAVENOUS; SUBCUTANEOUS PRN
Status: DISCONTINUED | OUTPATIENT
Start: 2024-04-19 | End: 2024-04-19 | Stop reason: SDUPTHER

## 2024-04-19 RX ORDER — MIDAZOLAM HYDROCHLORIDE 1 MG/ML
INJECTION INTRAMUSCULAR; INTRAVENOUS PRN
Status: DISCONTINUED | OUTPATIENT
Start: 2024-04-19 | End: 2024-04-19 | Stop reason: SDUPTHER

## 2024-04-19 RX ORDER — HYDROCODONE BITARTRATE AND ACETAMINOPHEN 5; 325 MG/1; MG/1
1 TABLET ORAL EVERY 6 HOURS PRN
Qty: 15 TABLET | Refills: 0 | Status: SHIPPED | OUTPATIENT
Start: 2024-04-19 | End: 2024-04-22

## 2024-04-19 RX ORDER — SODIUM CHLORIDE 0.9 % (FLUSH) 0.9 %
5-40 SYRINGE (ML) INJECTION PRN
Status: DISCONTINUED | OUTPATIENT
Start: 2024-04-19 | End: 2024-04-19 | Stop reason: HOSPADM

## 2024-04-19 RX ORDER — ACETAMINOPHEN 500 MG
1000 TABLET ORAL ONCE
Status: COMPLETED | OUTPATIENT
Start: 2024-04-19 | End: 2024-04-19

## 2024-04-19 RX ORDER — HYDROMORPHONE HYDROCHLORIDE 2 MG/ML
0.5 INJECTION, SOLUTION INTRAMUSCULAR; INTRAVENOUS; SUBCUTANEOUS EVERY 5 MIN PRN
Status: DISCONTINUED | OUTPATIENT
Start: 2024-04-19 | End: 2024-04-19 | Stop reason: HOSPADM

## 2024-04-19 RX ADMIN — SODIUM CHLORIDE, SODIUM LACTATE, POTASSIUM CHLORIDE, AND CALCIUM CHLORIDE: 600; 310; 30; 20 INJECTION, SOLUTION INTRAVENOUS at 13:27

## 2024-04-19 RX ADMIN — DEXAMETHASONE SODIUM PHOSPHATE 4 MG: 4 INJECTION INTRA-ARTICULAR; INTRALESIONAL; INTRAMUSCULAR; INTRAVENOUS; SOFT TISSUE at 12:55

## 2024-04-19 RX ADMIN — Medication 2000 MG: at 12:55

## 2024-04-19 RX ADMIN — HYDROMORPHONE HYDROCHLORIDE 0.2 MG: 2 INJECTION INTRAMUSCULAR; INTRAVENOUS; SUBCUTANEOUS at 13:48

## 2024-04-19 RX ADMIN — PROPOFOL 400 MG: 10 INJECTION, EMULSION INTRAVENOUS at 12:49

## 2024-04-19 RX ADMIN — FENTANYL CITRATE 25 MCG: 50 INJECTION, SOLUTION INTRAMUSCULAR; INTRAVENOUS at 13:13

## 2024-04-19 RX ADMIN — FENTANYL CITRATE 25 MCG: 50 INJECTION, SOLUTION INTRAMUSCULAR; INTRAVENOUS at 12:55

## 2024-04-19 RX ADMIN — ACETAMINOPHEN 1000 MG: 500 TABLET ORAL at 10:37

## 2024-04-19 RX ADMIN — KETOROLAC TROMETHAMINE 30 MG: 30 INJECTION INTRAMUSCULAR; INTRAVENOUS at 13:51

## 2024-04-19 RX ADMIN — SODIUM CHLORIDE, SODIUM LACTATE, POTASSIUM CHLORIDE, AND CALCIUM CHLORIDE: 600; 310; 30; 20 INJECTION, SOLUTION INTRAVENOUS at 10:38

## 2024-04-19 RX ADMIN — MIDAZOLAM 2 MG: 1 INJECTION INTRAMUSCULAR; INTRAVENOUS at 12:44

## 2024-04-19 RX ADMIN — FENTANYL CITRATE 25 MCG: 50 INJECTION, SOLUTION INTRAMUSCULAR; INTRAVENOUS at 12:59

## 2024-04-19 RX ADMIN — LIDOCAINE HYDROCHLORIDE 70 MG: 20 INJECTION, SOLUTION EPIDURAL; INFILTRATION; INTRACAUDAL; PERINEURAL at 12:49

## 2024-04-19 RX ADMIN — ONDANSETRON 4 MG: 2 INJECTION INTRAMUSCULAR; INTRAVENOUS at 12:55

## 2024-04-19 RX ADMIN — FENTANYL CITRATE 25 MCG: 50 INJECTION, SOLUTION INTRAMUSCULAR; INTRAVENOUS at 13:28

## 2024-04-19 RX ADMIN — OXYCODONE 10 MG: 5 TABLET ORAL at 14:50

## 2024-04-19 RX ADMIN — LIDOCAINE HYDROCHLORIDE 30 MG: 20 INJECTION, SOLUTION EPIDURAL; INFILTRATION; INTRACAUDAL; PERINEURAL at 13:13

## 2024-04-19 ASSESSMENT — PAIN DESCRIPTION - DESCRIPTORS
DESCRIPTORS: ACHING
DESCRIPTORS: SORE;DISCOMFORT

## 2024-04-19 ASSESSMENT — PAIN DESCRIPTION - LOCATION: LOCATION: GROIN

## 2024-04-19 ASSESSMENT — PAIN SCALES - GENERAL
PAINLEVEL_OUTOF10: 0
PAINLEVEL_OUTOF10: 9

## 2024-04-19 ASSESSMENT — PAIN - FUNCTIONAL ASSESSMENT: PAIN_FUNCTIONAL_ASSESSMENT: 0-10

## 2024-04-19 ASSESSMENT — PAIN DESCRIPTION - ORIENTATION: ORIENTATION: LEFT

## 2024-04-19 NOTE — ANESTHESIA POSTPROCEDURE EVALUATION
Department of Anesthesiology  Postprocedure Note    Patient: José Miguel Pandey  MRN: [id number]  YOB: 1985  Date of evaluation: 4/19/2024    Procedure Summary       Date: 04/19/24 Room / Location: [facility] MAIN OR 02 / [facility] MAIN OR    Anesthesia Start: 1241 Anesthesia Stop: 1400    Procedure: ORCHIECTOMY RADICAL/ LEFT (Left: Scrotum) Diagnosis:       Testicular mass      (Testicular mass [N50.89])    Providers: Luis Rojas DO Responsible Provider: JOSEY Becerra MD    Anesthesia Type: General ASA Status: 2            Anesthesia Type: General    Yuly Phase I: Yuly Score: 5    Yuly Phase II: Yuly Score: 10    Anesthesia Post Evaluation    Patient location during evaluation: PACU  Patient participation: complete - patient participated  Level of consciousness: awake and alert  Airway patency: patent  Nausea & Vomiting: no nausea and no vomiting  Cardiovascular status: hemodynamically stable  Respiratory status: acceptable  Hydration status: euvolemic  Comments: Blood pressure 125/70, pulse 68, temperature 97.9 °F (36.6 °C), temperature source Temporal, resp. rate 16, height 1.753 m (5' 9\"), weight 110.7 kg (244 lb), SpO2 98 %.    No apparent anesthetic complications.  Pt stable for discharge from PACU  Multimodal analgesia pain management approach  Pain management: adequate    No notable events documented.

## 2024-04-19 NOTE — ANESTHESIA PRE PROCEDURE
Department of Anesthesiology  Preprocedure Note       Name:  José Miguel Pandey   Age:  38 y.o.  :  1985                                          MRN:  [id number]         Date:  2024      Surgeon: Surgeon(s):  Luis Rojas DO    Procedure: Procedure(s):  ORCHIECTOMY RADICAL/ LEFT    Medications prior to admission:   Prior to Admission medications    Medication Sig Start Date End Date Taking? Authorizing Provider   ibuprofen (ADVIL;MOTRIN) 600 MG tablet Take 1 tablet by mouth 3 times daily as needed for Pain 10/6/22 4/19/24  Roseanne Valdez, APRN - CNP   ondansetron (ZOFRAN-ODT) 4 MG disintegrating tablet Take 1 tablet by mouth every 8 hours as needed  Patient not taking: Reported on 4/15/2024 6/6/20   Automatic Reconciliation, Ar       Current medications:    Current Facility-Administered Medications   Medication Dose Route Frequency Provider Last Rate Last Admin   • lidocaine 1 % injection 1 mL  1 mL IntraDERmal Once PRN JOSEY Becerra MD       • lactated ringers IV soln infusion   IntraVENous Continuous JOSEY Becerra MD 75 mL/hr at 24 1123 NoRateChange at 24 1123   • sodium chloride flush 0.9 % injection 5-40 mL  5-40 mL IntraVENous 2 times per day JOSEY Becerra MD       • sodium chloride flush 0.9 % injection 5-40 mL  5-40 mL IntraVENous PRN JOSEY Becerra MD       • 0.9 % sodium chloride infusion   IntraVENous PRN JOSEY Becerra MD       • midazolam PF (VERSED) injection 2 mg  2 mg IntraVENous Once PRN JOSEY Becerra MD       • ceFAZolin (ANCEF) 2000 mg in sterile water 20 mL IV syringe  2,000 mg IntraVENous On Call to OR Luis Rojas DO         Facility-Administered Medications Ordered in Other Encounters   Medication Dose Route Frequency Provider Last Rate Last Admin   • diatrizoate meglumine-sodium (GASTROGRAFIN) 66-10 % solution 15 mL  15 mL Oral ONCE PRN Luis Rojas DO   15 mL at 24 1432       Allergies:  No Known

## 2024-04-19 NOTE — ANESTHESIA PROCEDURE NOTES
Airway  Date/Time: 4/19/2024 12:51 PM  Urgency: elective      General Information and Staff    Patient location during procedure: OR  Resident/CRNA: Jaja Montes APRN - CRNA  Performed by: Jaja Montes APRN - CRNA  Authorized by: JOSEY Becerra MD      Indications and Patient Condition  Indications for airway management: anesthesia  Spontaneous Ventilation: absent  Sedation level: deep  Preoxygenated: yes  Patient position: sniffing  Mask difficulty assessment: vent by bag mask    Final Airway Details  Final airway type: supraglottic airway      Successful airway: oropharyngeal  Size 4     Number of attempts at approach: 1

## 2024-04-19 NOTE — DISCHARGE INSTRUCTIONS
Follow up with NP in 2 wks for wound check.  I will call pt with path result.  No lifting >10lbs x one mo.  May remove dressing in am.  Leave steris on till they fall off.         ACTIVITY  As tolerated and as directed by your doctor.   Bathe or shower as directed by your doctor.     DIET  Clear liquids until no nausea or vomiting; then light diet for the first day.  Advance to regular diet on second day, unless your doctor orders otherwise.   If nausea and vomiting continues, call your doctor.     PAIN  Take pain medication as directed by your doctor.   Call your doctor if pain is NOT relieved by medication.   DO NOT take aspirin of blood thinners unless directed by your doctor.       CALL YOUR DOCTOR IF   Excessive bleeding that does not stop after holding pressure over the area  Temperature of 101 degrees F or above  Excessive redness, swelling or bruising, and/ or green or yellow, smelly discharge from incision    After general anesthesia or intravenous sedation, for 24 hours or while taking prescription Narcotics:  Limit your activities  A responsible adult needs to be with you for the next 24 hours  Do not drive and operate hazardous machinery  Do not make important personal or business decisions  Do not drink alcoholic beverages  If you have not urinated within 8 hours after discharge, and you are experiencing discomfort from urinary retention, please go to the nearest ED.  If you have sleep apnea and have a CPAP machine, please use it for all naps and sleeping.  Please use caution when taking narcotics and any of your home medications that may cause drowsiness.  *  Please give a list of your current medications to your Primary Care Provider.  *  Please update this list whenever your medications are discontinued, doses are      changed, or new medications (including over-the-counter products) are added.  *  Please carry medication information at all times in case of emergency situations.    These are general

## 2024-04-19 NOTE — TELEPHONE ENCOUNTER
----- Message from Luis Rojas DO sent at 4/19/2024  2:10 PM EDT -----  Regarding: pt needs post op appt with NP in [facility] office in 2 wks.

## 2024-04-19 NOTE — BRIEF OP NOTE
Brief Postoperative Note      Patient: José Miguel Pandey  YOB: 1985  MRN: [id number]    Date of Procedure: 4/19/2024    Pre-Op Diagnosis Codes:     * Testicular mass [N50.89] LEFT    Post-Op Diagnosis: Same       Procedure(s):  ORCHIECTOMY RADICAL/ LEFT    Surgeon(s):  Lyndsey Arthur DO    Assistant:  * No surgical staff found *    Anesthesia: General    Estimated Blood Loss (mL): <5cc    Complications: none immediate    Specimens:   ID Type Source Tests Collected by Time Destination   A : LEFT SCROTUM Tissue Scrotum SURGICAL PATHOLOGY Lyndsey Arthur DO 4/19/2024 1331        Implants:  * No implants in log *      Drains: * No LDAs found *      Electronically signed by LYNDSEY ARTHUR DO on 4/19/2024 at 1:58 PM

## 2024-04-20 NOTE — OP NOTE
[address]  [address]                            OPERATIVE REPORT      PATIENT NAME: JENNIFER WALLACE           : 1985  MED REC NO: [id number]                       ROOM: [facility] NO: [id number]                       ADMIT DATE: 2024  PROVIDER: Luis Rojas DO    DATE OF SERVICE:  2024    PREOPERATIVE DIAGNOSES:  Left testicular mass.    POSTOPERATIVE DIAGNOSES:  Left testicular mass.    PROCEDURES PERFORMED:  Left radical orchiectomy.    SURGEON:  Luis Rojas DO    ASSISTANT:  None.    ANESTHESIA:  General.    ESTIMATED BLOOD LOSS:  Less than 5 mL.    SPECIMENS REMOVED:  Left testis and spermatic cord.    INTRAOPERATIVE FINDINGS:  Please see dictated operative note.     COMPLICATIONS:  None immediate.    IMPLANTS:  None.    INDICATIONS:  This is a 38-year-old gentleman who was recently discovered to have a left testicular mass after presenting with the left orchialgia.  A scrotal ultrasound on 2024 shows a 2.7 cm inferior pole mass.  Tumor markers were unremarkable, except for a mildly elevated HCG level.  CT of the chest, abdomen, and pelvis was negative for metastatic disease.  All risks, benefits, and alternatives to the above-mentioned procedure have been reviewed and he is willing to proceed at this time.    DESCRIPTION OF PROCEDURE:  The patient's consent was obtained.  The patient was brought back to the operating room, at which time he was placed in the supine position.  After the uneventful induction of general anesthesia, the patient's genital and abdominal areas were prepped and draped and a sterile field applied.  A small approximately 3 cm oblique incision was made in the left groin.  Dissection then carried down to the external oblique aponeurosis, which was then opened obliquely along its fibers using a scalpel from the external ring to the internal ring.  The spermatic cord was

## 2024-04-24 DIAGNOSIS — C62.12 MALIGNANT NEOPLASM OF DESCENDED LEFT TESTIS (HCC): Primary | ICD-10-CM

## 2024-04-26 DIAGNOSIS — R97.8 ELEVATED TUMOR MARKERS: ICD-10-CM

## 2024-04-26 DIAGNOSIS — C62.90 SEMINOMA (HCC): Primary | ICD-10-CM

## 2024-04-26 DIAGNOSIS — N50.89 TESTICULAR MASS: ICD-10-CM

## 2024-04-29 NOTE — PROGRESS NOTES
NEW PATIENT INTAKE    Referral Diagnosis: Malignant neoplasm of descended left testis    Referring Provider: Luis Rojas DO    Primary Care Provider: Marek Rubalcava MD    Family History of Cancer/ Hematology Disorders: Father with thyroid cancer and paternal uncle with unspecified type of cancer    Presenting Symptoms: Gradually enlarging left testis, left testicular pain/swelling     Chronological History of Pertinent Events: Mr. Pandey is a 38-year-old white male referred for testicular cancer.     4/9/24: Pt presented to the [facility] ED reporting gradually worsening left testicular pain and swelling x 3 weeks. He had been seen earlier at urgent care and was encouraged to go to the ED for ultrasound. Unfortunately, SCROTAL ULTRASOUND obtained in the ED identified a masslike area or areas within the left testicle concerning for testicular neoplasm and left varicocele. The right testicle was normal (Epic/Imaging). Pt was discharged with referral to Urology.     4/15/24: Urology consult with Dr. Luis Rojas  -AFP tumor marker WNL at 2.90 (Epic/Labs)  -CT C/A/P ordered for staging  -Treatment options discussed with patient who elected to pursue left radical orchiectomy    4/16/24: CT of the Chest, Abdomen, and Pelvis: No evidence of metastatic disease to the chest, abdomen, or pelvis. Fatty liver (Epic/Imaging)     4/19/24: Pt underwent left radical orchiectomy with pathology from the left scrotum revealing pure seminoma, measuring 3.9 cm in greatest dimension, confined to the testicular parenchyma with margins uninvolved (Epic/Labs)    4/24/24: Referred to BS    PATHOLOGY:     [facility] SURGICAL PATHOLOGY REPORT 4/19/24  DIAGNOSIS   “LEFT SCROTUM”: PURE SEMINOMA, 3.9 CM IN GREATEST DIMENSION, CONFINED TO THE TESTICULAR PARENCHYMA, MARGINS UNINVOLVED.    Notes from Referring Provider: None    Presented at Tumor Board: No     Other Pertinent Information: None

## 2024-04-30 ENCOUNTER — OFFICE VISIT (OUTPATIENT)
Dept: ONCOLOGY | Age: 39
End: 2024-04-30
Payer: MEDICAID

## 2024-04-30 ENCOUNTER — HOSPITAL ENCOUNTER (OUTPATIENT)
Dept: LAB | Age: 39
Discharge: HOME | End: 2024-05-03
Payer: MEDICAID

## 2024-04-30 VITALS
DIASTOLIC BLOOD PRESSURE: 88 MMHG | WEIGHT: 244 LBS | BODY MASS INDEX: 36.14 KG/M2 | TEMPERATURE: 97.8 F | SYSTOLIC BLOOD PRESSURE: 127 MMHG | HEIGHT: 69 IN | HEART RATE: 69 BPM | OXYGEN SATURATION: 98 % | RESPIRATION RATE: 16 BRPM

## 2024-04-30 DIAGNOSIS — C62.90 SEMINOMA (HCC): ICD-10-CM

## 2024-04-30 DIAGNOSIS — N50.89 TESTICULAR MASS: ICD-10-CM

## 2024-04-30 DIAGNOSIS — Z85.47 H/O TESTICULAR CANCER: Primary | ICD-10-CM

## 2024-04-30 DIAGNOSIS — R97.8 ELEVATED TUMOR MARKERS: ICD-10-CM

## 2024-04-30 LAB
AFP-TM SERPL-MCNC: 2.37 NG/ML (ref 0–8.3)
ALBUMIN SERPL-MCNC: 4 G/DL (ref 3.5–5)
ALBUMIN/GLOB SERPL: 1.1 (ref 1–1.9)
ALP SERPL-CCNC: 109 U/L (ref 40–129)
ALT SERPL-CCNC: 35 U/L (ref 12–65)
ANION GAP SERPL CALC-SCNC: 10 MMOL/L (ref 9–18)
APPEARANCE UR: CLEAR
AST SERPL-CCNC: 28 U/L (ref 15–37)
BACTERIA URNS QL MICRO: 0 /HPF
BASOPHILS # BLD: 0.1 K/UL (ref 0–0.2)
BASOPHILS NFR BLD: 1 % (ref 0–2)
BILIRUB SERPL-MCNC: 0.2 MG/DL (ref 0–1.2)
BILIRUB UR QL: NEGATIVE
BUN SERPL-MCNC: 19 MG/DL (ref 6–23)
CALCIUM SERPL-MCNC: 9.1 MG/DL (ref 8.8–10.2)
CASTS URNS QL MICRO: 0 /LPF
CHLORIDE SERPL-SCNC: 108 MMOL/L (ref 98–107)
CO2 SERPL-SCNC: 22 MMOL/L (ref 20–28)
COLOR UR: YELLOW
CREAT SERPL-MCNC: 0.95 MG/DL (ref 0.8–1.3)
CRYSTALS URNS QL MICRO: 0 /LPF
DIFFERENTIAL METHOD BLD: ABNORMAL
EOSINOPHIL # BLD: 0.2 K/UL (ref 0–0.8)
EOSINOPHIL NFR BLD: 3 % (ref 0.5–7.8)
EPI CELLS #/AREA URNS HPF: NORMAL /HPF
ERYTHROCYTE [DISTWIDTH] IN BLOOD BY AUTOMATED COUNT: 12.2 % (ref 11.9–14.6)
ERYTHROCYTE [SEDIMENTATION RATE] IN BLOOD: 7 MM/HR (ref 0–15)
FERRITIN SERPL-MCNC: 350 NG/ML (ref 8–388)
GLOBULIN SER CALC-MCNC: 3.6 G/DL (ref 2.3–3.5)
GLUCOSE SERPL-MCNC: 127 MG/DL (ref 70–99)
GLUCOSE UR STRIP.AUTO-MCNC: NEGATIVE MG/DL
HCG SERPL-ACNC: <1 MIU/ML
HCT VFR BLD AUTO: 42.3 % (ref 41.1–50.3)
HGB BLD-MCNC: 14.1 G/DL (ref 13.6–17.2)
HGB UR QL STRIP: ABNORMAL
IMM GRANULOCYTES # BLD AUTO: 0 K/UL (ref 0–0.5)
IMM GRANULOCYTES NFR BLD AUTO: 1 % (ref 0–5)
KETONES UR QL STRIP.AUTO: NEGATIVE MG/DL
LDH SERPL L TO P-CCNC: 166 U/L (ref 127–281)
LEUKOCYTE ESTERASE UR QL STRIP.AUTO: NEGATIVE
LYMPHOCYTES # BLD: 2.4 K/UL (ref 0.5–4.6)
LYMPHOCYTES NFR BLD: 35 % (ref 13–44)
MCH RBC QN AUTO: 28.9 PG (ref 26.1–32.9)
MCHC RBC AUTO-ENTMCNC: 33.3 G/DL (ref 31.4–35)
MCV RBC AUTO: 86.7 FL (ref 82–102)
MONOCYTES # BLD: 0.5 K/UL (ref 0.1–1.3)
MONOCYTES NFR BLD: 7 % (ref 4–12)
MUCOUS THREADS URNS QL MICRO: 0 /LPF
NEUTS SEG # BLD: 3.5 K/UL (ref 1.7–8.2)
NEUTS SEG NFR BLD: 53 % (ref 43–78)
NITRITE UR QL STRIP.AUTO: NEGATIVE
NRBC # BLD: 0 K/UL (ref 0–0.2)
PH UR STRIP: 5.5 (ref 5–9)
PLATELET # BLD AUTO: 315 K/UL (ref 150–450)
PMV BLD AUTO: 8.7 FL (ref 9.4–12.3)
POTASSIUM SERPL-SCNC: 4 MMOL/L (ref 3.5–5.1)
PROT SERPL-MCNC: 7.6 G/DL (ref 6.3–8.2)
PROT UR STRIP-MCNC: NEGATIVE MG/DL
RBC # BLD AUTO: 4.88 M/UL (ref 4.23–5.6)
RBC #/AREA URNS HPF: NORMAL /HPF
SODIUM SERPL-SCNC: 140 MMOL/L (ref 136–145)
SP GR UR REFRACTOMETRY: >=1.03 (ref 1–1.02)
T4 FREE SERPL-MCNC: 1.1 NG/DL (ref 0.9–1.7)
TSH, 3RD GENERATION: 2.49 UIU/ML (ref 0.27–4.2)
URATE SERPL-MCNC: 6.9 MG/DL (ref 3.9–8.2)
UROBILINOGEN UR QL STRIP.AUTO: 0.2 EU/DL
WBC # BLD AUTO: 6.7 K/UL (ref 4.3–11.1)
WBC URNS QL MICRO: NORMAL /HPF

## 2024-04-30 PROCEDURE — 84439 ASSAY OF FREE THYROXINE: CPT

## 2024-04-30 PROCEDURE — 82728 ASSAY OF FERRITIN: CPT

## 2024-04-30 PROCEDURE — 85652 RBC SED RATE AUTOMATED: CPT

## 2024-04-30 PROCEDURE — 82105 ALPHA-FETOPROTEIN SERUM: CPT

## 2024-04-30 PROCEDURE — 36415 COLL VENOUS BLD VENIPUNCTURE: CPT

## 2024-04-30 PROCEDURE — 84702 CHORIONIC GONADOTROPIN TEST: CPT

## 2024-04-30 PROCEDURE — 80053 COMPREHEN METABOLIC PANEL: CPT

## 2024-04-30 PROCEDURE — 85025 COMPLETE CBC W/AUTO DIFF WBC: CPT

## 2024-04-30 PROCEDURE — 99205 OFFICE O/P NEW HI 60 MIN: CPT | Performed by: PEDIATRICS

## 2024-04-30 PROCEDURE — 84443 ASSAY THYROID STIM HORMONE: CPT

## 2024-04-30 PROCEDURE — 84550 ASSAY OF BLOOD/URIC ACID: CPT

## 2024-04-30 PROCEDURE — 83615 LACTATE (LD) (LDH) ENZYME: CPT

## 2024-04-30 PROCEDURE — 81001 URINALYSIS AUTO W/SCOPE: CPT

## 2024-04-30 RX ORDER — DIPHENHYDRAMINE HCL 25 MG
25 TABLET ORAL EVERY 6 HOURS PRN
COMMUNITY

## 2024-04-30 ASSESSMENT — PATIENT HEALTH QUESTIONNAIRE - PHQ9
SUM OF ALL RESPONSES TO PHQ QUESTIONS 1-9: 0
SUM OF ALL RESPONSES TO PHQ QUESTIONS 1-9: 0
2. FEELING DOWN, DEPRESSED OR HOPELESS: NOT AT ALL
SUM OF ALL RESPONSES TO PHQ QUESTIONS 1-9: 0
SUM OF ALL RESPONSES TO PHQ9 QUESTIONS 1 & 2: 0
1. LITTLE INTEREST OR PLEASURE IN DOING THINGS: NOT AT ALL
SUM OF ALL RESPONSES TO PHQ QUESTIONS 1-9: 0

## 2024-04-30 NOTE — PROGRESS NOTES
Clinical Social Work Note     Date of Service: 4/30/2024     Length of Service: 15 minutes     Reason for Visit: New Patient Visit      Clinical Note: SW met with patient at new patient appt. See MD note. Patient accompanied by mother. SW introduced self and AYA services. No immediate needs expressed or identified at this time.     SW talked with patient about his fear of reoccurrence. SW normalized patient's feelings and offered emotional support. Patient appreciative of SW visit.      Next Steps: SW to follow up by phone, to complete social determinants of health. SW provided pt with SW contact information and encouraged pt to call should any needs arise. Pt verbalized understanding.     SW remains available.         4/30/2024     8:24 AM   PHQ-9    Little interest or pleasure in doing things 0   Feeling down, depressed, or hopeless 0   PHQ-2 Score 0   PHQ-9 Total Score 0

## 2024-04-30 NOTE — PROGRESS NOTES
HISTORY OF PRESENT ILLNESS  José Miguel is a 38 y.o. y.o. male with Testicular CA    ABSTRACT  NEW PATIENT INTAKE     Referral Diagnosis: Malignant neoplasm of descended left testis     Referring Provider: Luis Rojas DO     Primary Care Provider: Marek Rubalcava MD     Family History of Cancer/ Hematology Disorders: Father with thyroid cancer and paternal uncle with unspecified type of cancer     Presenting Symptoms: Gradually enlarging left testis, left testicular pain/swelling      Chronological History of Pertinent Events: Mr. Pandey is a 38-year-old white male referred for testicular cancer.      4/9/24: Pt presented to the [facility] ED reporting gradually worsening left testicular pain and swelling x 3 weeks. He had been seen earlier at urgent care and was encouraged to go to the ED for ultrasound. Unfortunately, SCROTAL ULTRASOUND obtained in the ED identified a masslike area or areas within the left testicle concerning for testicular neoplasm and left varicocele. The right testicle was normal (Epic/Imaging). Pt was discharged with referral to Urology.      4/15/24: Urology consult with Dr. Luis Rojas  -AFP tumor marker WNL at 2.90 (Epic/Labs)  -CT C/A/P ordered for staging  -Treatment options discussed with patient who elected to pursue left radical orchiectomy     4/16/24: CT of the Chest, Abdomen, and Pelvis: No evidence of metastatic disease to the chest, abdomen, or pelvis. Fatty liver (Epic/Imaging)      4/19/24: Pt underwent left radical orchiectomy with pathology from the left scrotum revealing pure seminoma, measuring 3.9 cm in greatest dimension, confined to the testicular parenchyma with margins uninvolved (Epic/Labs)     4/24/24: Referred to [facility]     PATHOLOGY:      [facility] SURGICAL PATHOLOGY REPORT 4/19/24  DIAGNOSIS   “LEFT SCROTUM”: PURE SEMINOMA, 3.9 CM IN GREATEST DIMENSION, CONFINED TO THE TESTICULAR PARENCHYMA, MARGINS UNINVOLVED.     Notes from Referring Provider: None     Presented at Tumor

## 2024-04-30 NOTE — PATIENT INSTRUCTIONS
Patient Instructions from Today's Visit    Reason for Visit:  New patient visit for testicular cancer (Seminoma)   S/p left orchiectomy 4-19-24    Upcoming Urology follow up 5-6-24    Plan:    Labs/exam/pathology/treatment vs surveillance option/plan etc reviewed by Dr Oropeza    Surveillance plan:   Lab work and physical exam monthly  Scans a couple times a year - next in July 2024     We will place an order for the next scan today.   A radiology scheduler will call you in the next few days to set up your scan. If you do not hear from them, call the radiology scheduling line: [phone].      Follow Up:  1 month (and monthly for a while- we will schedule out a few appointments)    Recent Lab Results:  Hospital Outpatient Visit on 04/30/2024   Component Date Value Ref Range Status    AFP-Tumor Marker 04/30/2024 2.37  0.00 - 8.30 ng/mL Final    Comment: Roche ECLIA methodology  Patient's results of tumor marker testing may not be comparable to labs using different manufacturers/methods.      hCG Quant 04/30/2024 <1  MIU/ML Final    Comment:    Male   <3 mIU/mL  Female <5 mIU/mL      Weeks of Gestation  mIU/mL  3                   6-71  4                     5                   [id number]  6                   150-09076  7                   [id number]  8                   [id number]  9                   [id number]  10                  [id number]  12                  [id number]  14                  53336-38200  15                  [id number]  16                  8921-02781  17                  [id number]  18                  [id number]      WBC 04/30/2024 6.7  4.3 - 11.1 K/uL Final    RBC 04/30/2024 4.88  4.23 - 5.6 M/uL Final    Hemoglobin 04/30/2024 14.1  13.6 - 17.2 g/dL Final    Hematocrit 04/30/2024 42.3  41.1 - 50.3 % Final    MCV 04/30/2024 86.7  82.0 - 102.0 FL Final    MCH 04/30/2024 28.9  26.1 - 32.9 PG Final    MCHC 04/30/2024 33.3  31.4 - 35.0 g/dL Final    RDW 04/30/2024 12.2  11.9 - 14.6

## 2024-05-02 ENCOUNTER — CLINICAL DOCUMENTATION (OUTPATIENT)
Dept: ONCOLOGY | Age: 39
End: 2024-05-02

## 2024-05-02 SDOH — SOCIAL STABILITY: SOCIAL NETWORK: HOW OFTEN DO YOU ATTEND CHURCH OR RELIGIOUS SERVICES?: MORE THAN 4 TIMES PER YEAR

## 2024-05-02 SDOH — SOCIAL STABILITY: SOCIAL INSECURITY
WITHIN THE LAST YEAR, HAVE TO BEEN RAPED OR FORCED TO HAVE ANY KIND OF SEXUAL ACTIVITY BY YOUR PARTNER OR EX-PARTNER?: NO

## 2024-05-02 SDOH — HEALTH STABILITY: PHYSICAL HEALTH: ON AVERAGE, HOW MANY MINUTES DO YOU ENGAGE IN EXERCISE AT THIS LEVEL?: 150+ MIN

## 2024-05-02 SDOH — HEALTH STABILITY: MENTAL HEALTH
STRESS IS WHEN SOMEONE FEELS TENSE, NERVOUS, ANXIOUS, OR CAN'T SLEEP AT NIGHT BECAUSE THEIR MIND IS TROUBLED. HOW STRESSED ARE YOU?: ONLY A LITTLE

## 2024-05-02 SDOH — ECONOMIC STABILITY: INCOME INSECURITY: HOW HARD IS IT FOR YOU TO PAY FOR THE VERY BASICS LIKE FOOD, HOUSING, MEDICAL CARE, AND HEATING?: NOT VERY HARD

## 2024-05-02 SDOH — ECONOMIC STABILITY: FOOD INSECURITY: WITHIN THE PAST 12 MONTHS, YOU WORRIED THAT YOUR FOOD WOULD RUN OUT BEFORE YOU GOT MONEY TO BUY MORE.: NEVER TRUE

## 2024-05-02 SDOH — ECONOMIC STABILITY: HOUSING INSECURITY
IN THE LAST 12 MONTHS, WAS THERE A TIME WHEN YOU DID NOT HAVE A STEADY PLACE TO SLEEP OR SLEPT IN A SHELTER (INCLUDING NOW)?: NO

## 2024-05-02 SDOH — SOCIAL STABILITY: SOCIAL NETWORK: ARE YOU MARRIED, WIDOWED, DIVORCED, SEPARATED, NEVER MARRIED, OR LIVING WITH A PARTNER?: MARRIED

## 2024-05-02 SDOH — ECONOMIC STABILITY: FOOD INSECURITY: WITHIN THE PAST 12 MONTHS, THE FOOD YOU BOUGHT JUST DIDN'T LAST AND YOU DIDN'T HAVE MONEY TO GET MORE.: SOMETIMES TRUE

## 2024-05-02 SDOH — SOCIAL STABILITY: SOCIAL INSECURITY
WITHIN THE LAST YEAR, HAVE YOU BEEN KICKED, HIT, SLAPPED, OR OTHERWISE PHYSICALLY HURT BY YOUR PARTNER OR EX-PARTNER?: NO

## 2024-05-02 SDOH — SOCIAL STABILITY: SOCIAL NETWORK
DO YOU BELONG TO ANY CLUBS OR ORGANIZATIONS SUCH AS CHURCH GROUPS UNIONS, FRATERNAL OR ATHLETIC GROUPS, OR SCHOOL GROUPS?: NO

## 2024-05-02 SDOH — SOCIAL STABILITY: SOCIAL INSECURITY: WITHIN THE LAST YEAR, HAVE YOU BEEN HUMILIATED OR EMOTIONALLY ABUSED IN OTHER WAYS BY YOUR PARTNER OR EX-PARTNER?: NO

## 2024-05-02 SDOH — ECONOMIC STABILITY: TRANSPORTATION INSECURITY
IN THE PAST 12 MONTHS, HAS THE LACK OF TRANSPORTATION KEPT YOU FROM MEDICAL APPOINTMENTS OR FROM GETTING MEDICATIONS?: NO

## 2024-05-02 SDOH — ECONOMIC STABILITY: INCOME INSECURITY: IN THE LAST 12 MONTHS, WAS THERE A TIME WHEN YOU WERE NOT ABLE TO PAY THE MORTGAGE OR RENT ON TIME?: NO

## 2024-05-02 SDOH — HEALTH STABILITY: MENTAL HEALTH: HOW MANY STANDARD DRINKS CONTAINING ALCOHOL DO YOU HAVE ON A TYPICAL DAY?: PATIENT DOES NOT DRINK

## 2024-05-02 SDOH — SOCIAL STABILITY: SOCIAL INSECURITY: WITHIN THE LAST YEAR, HAVE YOU BEEN AFRAID OF YOUR PARTNER OR EX-PARTNER?: NO

## 2024-05-02 SDOH — SOCIAL STABILITY: SOCIAL NETWORK: HOW OFTEN DO YOU GET TOGETHER WITH FRIENDS OR RELATIVES?: MORE THAN THREE TIMES A WEEK

## 2024-05-02 SDOH — SOCIAL STABILITY: SOCIAL NETWORK
IN A TYPICAL WEEK, HOW MANY TIMES DO YOU TALK ON THE PHONE WITH FAMILY, FRIENDS, OR NEIGHBORS?: MORE THAN THREE TIMES A WEEK

## 2024-05-02 SDOH — HEALTH STABILITY: PHYSICAL HEALTH: ON AVERAGE, HOW MANY DAYS PER WEEK DO YOU ENGAGE IN MODERATE TO STRENUOUS EXERCISE (LIKE A BRISK WALK)?: 7 DAYS

## 2024-05-02 SDOH — SOCIAL STABILITY: SOCIAL NETWORK: HOW OFTEN DO YOU ATTENT MEETINGS OF THE CLUB OR ORGANIZATION YOU BELONG TO?: NEVER

## 2024-05-02 SDOH — ECONOMIC STABILITY: TRANSPORTATION INSECURITY
IN THE PAST 12 MONTHS, HAS LACK OF TRANSPORTATION KEPT YOU FROM MEETINGS, WORK, OR FROM GETTING THINGS NEEDED FOR DAILY LIVING?: NO

## 2024-05-02 NOTE — PROGRESS NOTES
Social Determinants of Health     Tobacco Use: Low Risk  (4/30/2024)    Patient History     Smoking Tobacco Use: Never     Smokeless Tobacco Use: Never     Passive Exposure: Not on file   Alcohol Use: Not At Risk (5/2/2024)    AUDIT-C     Frequency of Alcohol Consumption: Never     Average Number of Drinks: Patient does not drink     Frequency of Binge Drinking: Never   Financial Resource Strain: Low Risk  (5/2/2024)    Overall Financial Resource Strain (CARDIA)     Difficulty of Paying Living Expenses: Not very hard   Food Insecurity: Food Insecurity Present (5/2/2024)    Hunger Vital Sign     Worried About Running Out of Food in the Last Year: Never true     Ran Out of Food in the Last Year: Sometimes true   Transportation Needs: No Transportation Needs (5/2/2024)    PRAPARE - Transportation     Lack of Transportation (Medical): No     Lack of Transportation (Non-Medical): No   Physical Activity: Sufficiently Active (5/2/2024)    Exercise Vital Sign     Days of Exercise per Week: 7 days     Minutes of Exercise per Session: 150+ min   Stress: No Stress Concern Present (5/2/2024)    [employer] - Occupational Stress Questionnaire     Feeling of Stress : Only a little   Social Connections: Moderately Integrated (5/2/2024)    Social Connection and Isolation Panel [NHANES]     Frequency of Communication with Friends and Family: More than three times a week     Frequency of Social Gatherings with Friends and Family: More than three times a week     Attends [employer] Services: More than 4 times per year     Active Member of Clubs or Organizations: No     Attends Club or Organization Meetings: Never     Marital Status:    Intimate Partner Violence: Not At Risk (5/2/2024)    Humiliation, Afraid, Rape, and Kick questionnaire     Fear of Current or Ex-Partner: No     Emotionally Abused: No     Physically Abused: No     Sexually Abused: No   Depression: Not at risk (4/30/2024)    PHQ-2

## 2024-05-03 LAB — PERIPHERAL SMEAR, MD REVIEW: NORMAL

## 2024-05-06 ENCOUNTER — OFFICE VISIT (OUTPATIENT)
Dept: UROLOGY | Age: 39
End: 2024-05-06
Payer: MEDICAID

## 2024-05-06 DIAGNOSIS — N50.89 TESTICULAR MASS: Primary | ICD-10-CM

## 2024-05-06 LAB
BILIRUBIN, URINE, POC: NEGATIVE
BLOOD URINE, POC: NEGATIVE
GLUCOSE URINE, POC: NEGATIVE
KETONES, URINE, POC: NEGATIVE
NITRITE, URINE, POC: NEGATIVE
PH, URINE, POC: 5.5 (ref 4.6–8)
PROTEIN,URINE, POC: NEGATIVE
SPECIFIC GRAVITY, URINE, POC: 1.02 (ref 1–1.03)
URINALYSIS CLARITY, POC: NORMAL
URINALYSIS COLOR, POC: NORMAL
UROBILINOGEN, POC: NORMAL

## 2024-05-06 PROCEDURE — 81003 URINALYSIS AUTO W/O SCOPE: CPT | Performed by: NURSE PRACTITIONER

## 2024-05-06 ASSESSMENT — ENCOUNTER SYMPTOMS: BACK PAIN: 0

## 2024-05-06 NOTE — PROGRESS NOTES
[facility]  [address] [place] [id number]  [phone]          José Miguel Pandey  : 1985    Chief Complaint   Patient presents with    Post-Op Check     Testicular mass          HPI     José Miguel Pandey is a 38 y.o. male here today for post op visit. He s/p left radical orchiectomy on 24 by Dr. Rojas. Path \"LEFT SCROTUM\":  PURE SEMINOMA, 3.9 CM IN GREATEST DIMENSION, CONFINED TO THE TESTICULAR PARENCHYMA, MARGINS UNINVOLVED. CT CAP shows no mets. He was referred to oncology. Has seen Dr. Oropeza. Surveillance recommended.     Reports he is recovering well. Has been quite active since surgery. Reports int burning to left scrotum. Afebrile.           Past Medical History:   Diagnosis Date    High cholesterol      Past Surgical History:   Procedure Laterality Date    CYST REMOVAL      Shoulder    TESTICLE REMOVAL Left 2024    ORCHIECTOMY RADICAL/ LEFT performed by Luis Rojas, DO at [facility] MAIN OR     Current Outpatient Medications   Medication Sig Dispense Refill    diphenhydrAMINE (BENADRYL) 25 MG tablet Take 1 tablet by mouth every 6 hours as needed for Itching      ondansetron (ZOFRAN-ODT) 4 MG disintegrating tablet Take 1 tablet by mouth every 8 hours as needed      ibuprofen (ADVIL;MOTRIN) 600 MG tablet Take 1 tablet by mouth 3 times daily as needed for Pain 21 tablet 0     No current facility-administered medications for this visit.     No Known Allergies  Social History     Socioeconomic History    Marital status:      Spouse name: Not on file    Number of children: Not on file    Years of education: Not on file    Highest education level: Not on file   Occupational History    Not on file   Tobacco Use    Smoking status: Never    Smokeless tobacco: Never   Vaping Use    Vaping Use: Never used   Substance and Sexual Activity    Alcohol use: Not Currently    Drug use: Never    Sexual activity: Defer   Other Topics Concern    Not on

## 2024-06-05 ENCOUNTER — HOSPITAL ENCOUNTER (OUTPATIENT)
Dept: LAB | Age: 39
Discharge: HOME | End: 2024-06-08
Payer: MEDICAID

## 2024-06-05 ENCOUNTER — CLINICAL DOCUMENTATION (OUTPATIENT)
Dept: ONCOLOGY | Age: 39
End: 2024-06-05

## 2024-06-05 ENCOUNTER — OFFICE VISIT (OUTPATIENT)
Dept: ONCOLOGY | Age: 39
End: 2024-06-05

## 2024-06-05 VITALS
HEART RATE: 68 BPM | TEMPERATURE: 98.1 F | WEIGHT: 243 LBS | DIASTOLIC BLOOD PRESSURE: 84 MMHG | RESPIRATION RATE: 16 BRPM | OXYGEN SATURATION: 95 % | BODY MASS INDEX: 35.99 KG/M2 | SYSTOLIC BLOOD PRESSURE: 117 MMHG | HEIGHT: 69 IN

## 2024-06-05 DIAGNOSIS — N50.89 TESTICULAR MASS: ICD-10-CM

## 2024-06-05 DIAGNOSIS — Z85.47 H/O TESTICULAR CANCER: ICD-10-CM

## 2024-06-05 DIAGNOSIS — C62.90 SEMINOMA (HCC): Primary | ICD-10-CM

## 2024-06-05 LAB
AFP-TM SERPL-MCNC: 2.73 NG/ML (ref 0–8.3)
ALBUMIN SERPL-MCNC: 4 G/DL (ref 3.5–5)
ALBUMIN/GLOB SERPL: 1.3 (ref 1–1.9)
ALP SERPL-CCNC: 102 U/L (ref 40–129)
ALT SERPL-CCNC: 48 U/L (ref 12–65)
ANION GAP SERPL CALC-SCNC: 11 MMOL/L (ref 9–18)
AST SERPL-CCNC: 34 U/L (ref 15–37)
BASOPHILS # BLD: 0.1 K/UL (ref 0–0.2)
BASOPHILS NFR BLD: 1 % (ref 0–2)
BILIRUB SERPL-MCNC: 0.3 MG/DL (ref 0–1.2)
BUN SERPL-MCNC: 18 MG/DL (ref 6–23)
CALCIUM SERPL-MCNC: 8.9 MG/DL (ref 8.8–10.2)
CHLORIDE SERPL-SCNC: 107 MMOL/L (ref 98–107)
CO2 SERPL-SCNC: 21 MMOL/L (ref 20–28)
CREAT SERPL-MCNC: 0.96 MG/DL (ref 0.8–1.3)
DIFFERENTIAL METHOD BLD: ABNORMAL
EOSINOPHIL # BLD: 0.2 K/UL (ref 0–0.8)
EOSINOPHIL NFR BLD: 4 % (ref 0.5–7.8)
ERYTHROCYTE [DISTWIDTH] IN BLOOD BY AUTOMATED COUNT: 12.7 % (ref 11.9–14.6)
GLOBULIN SER CALC-MCNC: 3 G/DL (ref 2.3–3.5)
GLUCOSE SERPL-MCNC: 121 MG/DL (ref 70–99)
HCG SERPL-ACNC: <1 MIU/ML
HCT VFR BLD AUTO: 41.1 % (ref 41.1–50.3)
HGB BLD-MCNC: 13.2 G/DL (ref 13.6–17.2)
IMM GRANULOCYTES # BLD AUTO: 0 K/UL (ref 0–0.5)
IMM GRANULOCYTES NFR BLD AUTO: 0 % (ref 0–5)
LDH SERPL L TO P-CCNC: 156 U/L (ref 127–281)
LYMPHOCYTES # BLD: 1.8 K/UL (ref 0.5–4.6)
LYMPHOCYTES NFR BLD: 34 % (ref 13–44)
MCH RBC QN AUTO: 28.1 PG (ref 26.1–32.9)
MCHC RBC AUTO-ENTMCNC: 32.1 G/DL (ref 31.4–35)
MCV RBC AUTO: 87.4 FL (ref 82–102)
MONOCYTES # BLD: 0.4 K/UL (ref 0.1–1.3)
MONOCYTES NFR BLD: 7 % (ref 4–12)
NEUTS SEG # BLD: 2.8 K/UL (ref 1.7–8.2)
NEUTS SEG NFR BLD: 54 % (ref 43–78)
NRBC # BLD: 0 K/UL (ref 0–0.2)
PLATELET # BLD AUTO: 265 K/UL (ref 150–450)
PMV BLD AUTO: 9 FL (ref 9.4–12.3)
POTASSIUM SERPL-SCNC: 3.9 MMOL/L (ref 3.5–5.1)
PROT SERPL-MCNC: 7 G/DL (ref 6.3–8.2)
RBC # BLD AUTO: 4.7 M/UL (ref 4.23–5.6)
SODIUM SERPL-SCNC: 139 MMOL/L (ref 136–145)
WBC # BLD AUTO: 5.2 K/UL (ref 4.3–11.1)

## 2024-06-05 PROCEDURE — 36415 COLL VENOUS BLD VENIPUNCTURE: CPT

## 2024-06-05 PROCEDURE — 80053 COMPREHEN METABOLIC PANEL: CPT

## 2024-06-05 PROCEDURE — 85025 COMPLETE CBC W/AUTO DIFF WBC: CPT

## 2024-06-05 PROCEDURE — 83615 LACTATE (LD) (LDH) ENZYME: CPT

## 2024-06-05 PROCEDURE — 84702 CHORIONIC GONADOTROPIN TEST: CPT

## 2024-06-05 PROCEDURE — 82105 ALPHA-FETOPROTEIN SERUM: CPT

## 2024-06-05 ASSESSMENT — PATIENT HEALTH QUESTIONNAIRE - PHQ9
1. LITTLE INTEREST OR PLEASURE IN DOING THINGS: NOT AT ALL
SUM OF ALL RESPONSES TO PHQ9 QUESTIONS 1 & 2: 0
SUM OF ALL RESPONSES TO PHQ QUESTIONS 1-9: 0
2. FEELING DOWN, DEPRESSED OR HOPELESS: NOT AT ALL
SUM OF ALL RESPONSES TO PHQ QUESTIONS 1-9: 0

## 2024-06-05 NOTE — PATIENT INSTRUCTIONS
Patient Information from Today's Visit    The members of your Oncology Medical Home are listed below:    Physician Provider: Kenton Oropeza Medical Oncologist  Advanced Practice Clinician: Alexia Francisco NP  Registered Nurse: Adriana BOBO RN  Navigator: N/A  Medical Assistant: Alexia AUGUST MA  : Yamilet WU   Supportive Care Services: Saranya HOPE LMSW    Diagnosis:   Follow up: Testicular cancer (Seminoma)   S/p left orchiectomy 4-19-24     Upcoming repeat scans: July    Follow Up Instructions:     You have upcoming CT scan scheduled for July. You will need to ask for a Chest Xray that day as well. This is ordered but does not get scheduled.   We will order a repeat ultrasound to be done at the same time.   A radiology scheduler will call you in the next few days to set up your scan. If you do not hear from them, call the radiology scheduling line: [phone].    Follow up the week after scans. We will adjust your apts.     Treatment Summary has been discussed and given to patient:N/A      Current Labs:   Hospital Outpatient Visit on 06/05/2024   Component Date Value Ref Range Status    WBC 06/05/2024 5.2  4.3 - 11.1 K/uL Final    RBC 06/05/2024 4.70  4.23 - 5.6 M/uL Final    Hemoglobin 06/05/2024 13.2 (L)  13.6 - 17.2 g/dL Final    Hematocrit 06/05/2024 41.1  41.1 - 50.3 % Final    MCV 06/05/2024 87.4  82.0 - 102.0 FL Final    MCH 06/05/2024 28.1  26.1 - 32.9 PG Final    MCHC 06/05/2024 32.1  31.4 - 35.0 g/dL Final    RDW 06/05/2024 12.7  11.9 - 14.6 % Final    Platelets 06/05/2024 265  150 - 450 K/uL Final    MPV 06/05/2024 9.0 (L)  9.4 - 12.3 FL Final    nRBC 06/05/2024 0.00  0.0 - 0.2 K/uL Final    **Note: Absolute NRBC parameter is now reported with Hemogram**    Neutrophils % 06/05/2024 54  43 - 78 % Final    Lymphocytes % 06/05/2024 34  13 - 44 % Final    Monocytes % 06/05/2024 7  4.0 - 12.0 % Final    Eosinophils % 06/05/2024 4  0.5 - 7.8 % Final    Basophils % 06/05/2024 1  0.0 - 2.0 %

## 2024-06-05 NOTE — PROGRESS NOTES
Patient presents today for a follow up visit, accompanied by mother and patient's 10 yr old niece. SW offered to sit with patient niece's in AYA waiting room as a distraction while completing visit. Patient agreeable.     No other supportive care needs identified or expressed. SW remains available.

## 2024-06-05 NOTE — PROGRESS NOTES
HISTORY OF PRESENT ILLNESS  José Miguel is a 38 y.o. y.o. male with Testicular CA    ABSTRACT  NEW PATIENT INTAKE     Referral Diagnosis: Malignant neoplasm of descended left testis     Referring Provider: Luis Rojas DO     Primary Care Provider: Marek Rubalcava MD     Family History of Cancer/ Hematology Disorders: Father with thyroid cancer and paternal uncle with unspecified type of cancer     Presenting Symptoms: Gradually enlarging left testis, left testicular pain/swelling      Chronological History of Pertinent Events: Mr. Pandey is a 38-year-old white male referred for testicular cancer.      4/9/24: Pt presented to the [facility] ED reporting gradually worsening left testicular pain and swelling x 3 weeks. He had been seen earlier at urgent care and was encouraged to go to the ED for ultrasound. Unfortunately, SCROTAL ULTRASOUND obtained in the ED identified a masslike area or areas within the left testicle concerning for testicular neoplasm and left varicocele. The right testicle was normal (Epic/Imaging). Pt was discharged with referral to Urology.      4/15/24: Urology consult with Dr. Luis Rojas  -AFP tumor marker WNL at 2.90 (Epic/Labs)  -CT C/A/P ordered for staging  -Treatment options discussed with patient who elected to pursue left radical orchiectomy     4/16/24: CT of the Chest, Abdomen, and Pelvis: No evidence of metastatic disease to the chest, abdomen, or pelvis. 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     Notes from Referring Provider: None     Presented at Tumor

## 2024-07-09 ENCOUNTER — TELEPHONE (OUTPATIENT)
Dept: ONCOLOGY | Age: 39
End: 2024-07-09

## 2024-07-09 NOTE — TELEPHONE ENCOUNTER
Physician provider: Dr. Oropeza  Reason for today's call (Please detail here patients chief complaint): Knot on Testicles  Last office visit:06/05/24  Preferred pharmacy (If refill request): n/a  Calls to office within the last 48 hours?:No    Patient notified that their information will be routed to the [facility] clinical triage team for review. Patient is advised that they will receive a phone call from the triage department. If symptom related and symptoms worsen before receiving a call back, the patient has been advised to proceed to the nearest ED.    Pt state he noticed a Knot near his testicles that is sore to the touch. Pt also want to verify due to Knot if he needs to be seen sooner.

## 2024-07-09 NOTE — TELEPHONE ENCOUNTER
Subjective    Chief Complaint: Left testicle nodule  Details of Complaint: Patient states he found a left testicle nodule while in shower on .  Denies pain unless pressure applied.      Objective    Date of last Office Visit: 24   Date of last labs: 24   Date of last imagin24  Date of last treatment, if applicable:na      Plan/Intervention    Proposed Plan: Informed of upcoming ultrasound, CT scan and OV.  Informed to notify clinic if area grows in size/edematous , becomes reddened or fever.  Patient verbalized understanding of plan: YES/NO: Yes  Patient voiced intended compliance with plan: Verbalized/ Refused: Verbalized intended compliance

## 2024-07-16 ENCOUNTER — HOSPITAL ENCOUNTER (OUTPATIENT)
Dept: ULTRASOUND IMAGING | Age: 39
Discharge: HOME | End: 2024-07-19
Attending: PEDIATRICS
Payer: MEDICAID

## 2024-07-16 ENCOUNTER — HOSPITAL ENCOUNTER (OUTPATIENT)
Dept: GENERAL RADIOLOGY | Age: 39
Discharge: HOME | End: 2024-07-19
Attending: PEDIATRICS
Payer: MEDICAID

## 2024-07-16 ENCOUNTER — HOSPITAL ENCOUNTER (OUTPATIENT)
Dept: CT IMAGING | Age: 39
Discharge: HOME | End: 2024-07-19
Attending: PEDIATRICS
Payer: MEDICAID

## 2024-07-16 DIAGNOSIS — Z85.47 H/O TESTICULAR CANCER: ICD-10-CM

## 2024-07-16 DIAGNOSIS — C62.90 SEMINOMA (HCC): ICD-10-CM

## 2024-07-16 PROCEDURE — 76870 US EXAM SCROTUM: CPT

## 2024-07-16 PROCEDURE — 6360000004 HC RX CONTRAST MEDICATION: Performed by: PEDIATRICS

## 2024-07-16 PROCEDURE — 74177 CT ABD & PELVIS W/CONTRAST: CPT

## 2024-07-16 PROCEDURE — 71046 X-RAY EXAM CHEST 2 VIEWS: CPT

## 2024-07-16 RX ADMIN — IOPAMIDOL 100 ML: 755 INJECTION, SOLUTION INTRAVENOUS at 10:01

## 2024-07-16 RX ADMIN — DIATRIZOATE MEGLUMINE AND DIATRIZOATE SODIUM 15 ML: 660; 100 LIQUID ORAL; RECTAL at 10:01

## 2024-07-23 ENCOUNTER — HOSPITAL ENCOUNTER (OUTPATIENT)
Dept: LAB | Age: 39
Discharge: HOME | End: 2024-07-26
Payer: MEDICAID

## 2024-07-23 ENCOUNTER — OFFICE VISIT (OUTPATIENT)
Dept: ONCOLOGY | Age: 39
End: 2024-07-23
Payer: MEDICAID

## 2024-07-23 VITALS
HEART RATE: 79 BPM | TEMPERATURE: 98.2 F | WEIGHT: 248.9 LBS | DIASTOLIC BLOOD PRESSURE: 84 MMHG | RESPIRATION RATE: 18 BRPM | OXYGEN SATURATION: 95 % | HEIGHT: 69 IN | SYSTOLIC BLOOD PRESSURE: 121 MMHG | BODY MASS INDEX: 36.87 KG/M2

## 2024-07-23 DIAGNOSIS — Z85.47 H/O TESTICULAR CANCER: ICD-10-CM

## 2024-07-23 DIAGNOSIS — C62.90 SEMINOMA (HCC): Primary | ICD-10-CM

## 2024-07-23 DIAGNOSIS — C62.90 SEMINOMA (HCC): ICD-10-CM

## 2024-07-23 LAB
AFP-TM SERPL-MCNC: 3.6 NG/ML (ref 0–8.3)
ALBUMIN SERPL-MCNC: 4.1 G/DL (ref 3.5–5)
ALBUMIN/GLOB SERPL: 1.4 (ref 1–1.9)
ALP SERPL-CCNC: 108 U/L (ref 40–129)
ALT SERPL-CCNC: 59 U/L (ref 12–65)
ANION GAP SERPL CALC-SCNC: 11 MMOL/L (ref 9–18)
AST SERPL-CCNC: 36 U/L (ref 15–37)
BASOPHILS # BLD: 0.1 K/UL (ref 0–0.2)
BASOPHILS NFR BLD: 2 % (ref 0–2)
BILIRUB SERPL-MCNC: 0.2 MG/DL (ref 0–1.2)
BUN SERPL-MCNC: 18 MG/DL (ref 6–23)
CALCIUM SERPL-MCNC: 8.9 MG/DL (ref 8.8–10.2)
CHLORIDE SERPL-SCNC: 106 MMOL/L (ref 98–107)
CO2 SERPL-SCNC: 22 MMOL/L (ref 20–28)
CREAT SERPL-MCNC: 1.09 MG/DL (ref 0.8–1.3)
DIFFERENTIAL METHOD BLD: ABNORMAL
EOSINOPHIL # BLD: 0.2 K/UL (ref 0–0.8)
EOSINOPHIL NFR BLD: 3 % (ref 0.5–7.8)
ERYTHROCYTE [DISTWIDTH] IN BLOOD BY AUTOMATED COUNT: 12.6 % (ref 11.9–14.6)
GLOBULIN SER CALC-MCNC: 3 G/DL (ref 2.3–3.5)
GLUCOSE SERPL-MCNC: 120 MG/DL (ref 70–99)
HCG SERPL-ACNC: <1 MIU/ML
HCT VFR BLD AUTO: 42.2 % (ref 41.1–50.3)
HGB BLD-MCNC: 13.8 G/DL (ref 13.6–17.2)
IMM GRANULOCYTES # BLD AUTO: 0 K/UL (ref 0–0.5)
IMM GRANULOCYTES NFR BLD AUTO: 1 % (ref 0–5)
LDH SERPL L TO P-CCNC: 167 U/L (ref 127–281)
LYMPHOCYTES # BLD: 2 K/UL (ref 0.5–4.6)
LYMPHOCYTES NFR BLD: 31 % (ref 13–44)
MCH RBC QN AUTO: 28.5 PG (ref 26.1–32.9)
MCHC RBC AUTO-ENTMCNC: 32.7 G/DL (ref 31.4–35)
MCV RBC AUTO: 87.2 FL (ref 82–102)
MONOCYTES # BLD: 0.5 K/UL (ref 0.1–1.3)
MONOCYTES NFR BLD: 8 % (ref 4–12)
NEUTS SEG # BLD: 3.5 K/UL (ref 1.7–8.2)
NEUTS SEG NFR BLD: 55 % (ref 43–78)
NRBC # BLD: 0 K/UL (ref 0–0.2)
PLATELET # BLD AUTO: 270 K/UL (ref 150–450)
PMV BLD AUTO: 9.1 FL (ref 9.4–12.3)
POTASSIUM SERPL-SCNC: 4 MMOL/L (ref 3.5–5.1)
PROT SERPL-MCNC: 7.2 G/DL (ref 6.3–8.2)
RBC # BLD AUTO: 4.84 M/UL (ref 4.23–5.6)
SODIUM SERPL-SCNC: 139 MMOL/L (ref 136–145)
WBC # BLD AUTO: 6.3 K/UL (ref 4.3–11.1)

## 2024-07-23 PROCEDURE — 82105 ALPHA-FETOPROTEIN SERUM: CPT

## 2024-07-23 PROCEDURE — 80053 COMPREHEN METABOLIC PANEL: CPT

## 2024-07-23 PROCEDURE — 85025 COMPLETE CBC W/AUTO DIFF WBC: CPT

## 2024-07-23 PROCEDURE — 84702 CHORIONIC GONADOTROPIN TEST: CPT

## 2024-07-23 PROCEDURE — 83615 LACTATE (LD) (LDH) ENZYME: CPT

## 2024-07-23 PROCEDURE — 99215 OFFICE O/P EST HI 40 MIN: CPT | Performed by: PEDIATRICS

## 2024-07-23 PROCEDURE — 36415 COLL VENOUS BLD VENIPUNCTURE: CPT

## 2024-07-23 NOTE — PATIENT INSTRUCTIONS
Patient Information from Today's Visit  The members of your Oncology Medical Home are listed below:     Physician Provider: Kenton Oropeza Medical Oncologist  Advanced Practice Clinician: Alexia Francisco NP  Registered Nurse: Adriana BOBO RN  Navigator: N/A  Medical Assistant: Alexia AUGUST MA  : Yamilet WU   Supportive Care Services: Saranya HOPE LMSW     Diagnosis:   Follow up: Testicular cancer (Seminoma)   S/p left orchiectomy 4-19-24     Upcoming repeat scans: October (~1 week prior to visit)    Follow Up Instructions:     Keep follow up apts with Dr Rojas in ~2 weeks as scheduled. If your pain worsens, contact their office.     Scans reviewed from today.   Repeat Chest Xray and CT scan will need to be scheduled for mid October (about a week prior to follow up with Dr Oropeza in 3 months).   A radiology scheduler will call you in the next few days to set up your scan. If you do not hear from them, call the radiology scheduling line: [phone].      Treatment Summary has been discussed and given to patient: N/A      Current Labs:   Hospital Outpatient Visit on 07/23/2024   Component Date Value Ref Range Status    WBC 07/23/2024 6.3  4.3 - 11.1 K/uL Final    RBC 07/23/2024 4.84  4.23 - 5.6 M/uL Final    Hemoglobin 07/23/2024 13.8  13.6 - 17.2 g/dL Final    Hematocrit 07/23/2024 42.2  41.1 - 50.3 % Final    MCV 07/23/2024 87.2  82.0 - 102.0 FL Final    MCH 07/23/2024 28.5  26.1 - 32.9 PG Final    MCHC 07/23/2024 32.7  31.4 - 35.0 g/dL Final    RDW 07/23/2024 12.6  11.9 - 14.6 % Final    Platelets 07/23/2024 270  150 - 450 K/uL Final    MPV 07/23/2024 9.1 (L)  9.4 - 12.3 FL Final    nRBC 07/23/2024 0.00  0.0 - 0.2 K/uL Final    **Note: Absolute NRBC parameter is now reported with Hemogram**    Neutrophils % 07/23/2024 55  43 - 78 % Final    Lymphocytes % 07/23/2024 31  13 - 44 % Final    Monocytes % 07/23/2024 8  4.0 - 12.0 % Final    Eosinophils % 07/23/2024 3  0.5 - 7.8 % Final    Basophils %

## 2024-07-23 NOTE — PROGRESS NOTES
I, active surveillance  -repeat CT AP in 3 months from last  -presented NCCN guided obs with modifications  -elgible for current cooperative group study assessing [id number] as biomarker   -Will present AYA team and AYA registry  -not interested in more children  Recommend follow up per NCCN guideline  Rtc  3 months  Pathological T Primary Tumor  pTX       Primary tumor cannot be assessed  pT0       No evidence of primary tumor  pTis       Germ cell neoplasia in situ  pT1        Tumor limited to testis (including rete testis invasion) without lymphovascular invasion pT1a*     Tumor smaller than 3 cm in size  pT1b*    Tumor 3 cm or larger in size    Table 2. AJCC Prognostic Stage Groups     T N M S   Stage 0 pTis N0 M0 S0   Stage I pT1-T4 N0 M0 SX   Stage IA pT1 N0 M0 S0   Stage IB pT2 N0 M0 S0    pT3 N0 M0 S0    pT4 N0 M0 S0        Year (at month intervals)    1 2 3 4 5d   H&Pa,b Every 3-6 mo Every 6 mo Every 6-12 mo Annually Annually   Abdominal ± Pelvic CTc,e At 4-6,  and 12 mo Every 6 mo Every 6-12 mo Every 12-24 mo       6/23/24    USFindings:  The right testicle is of normal echogenicity and measures 4.9 x 2.3 x 3.3 cm. No  intratesticular masses are identified. Arterial and venous blood flow are  documented in the right testicle at color Doppler and spectral Doppler  evaluation. The right epididymal head measures 1.5 x 0.8 x 1.2 cm. Right  epididymal head cyst measuring 0.2 x 0.2 x 0.2 cm.     Prior left orchiectomy. Hypoechoic area measuring 2.0 x 1.0 x 1.9 cm with  increased vascularity.     Negative for hydrocele or varicocele.     IMPRESSION:  1. Possibly complex fluid structure in the left inguinal region versus  postoperative changes. Attention on follow-up.       CT AP and CXR reviewed and negative  6/23/24  Doing well   Mild pain and small nodule at surgical site/scrotum, tumor markers negative  Sees urology on 8/8 and ask to address but likely post surgical  Repeat scans next visit in 3 months

## 2024-08-06 ENCOUNTER — OFFICE VISIT (OUTPATIENT)
Dept: UROLOGY | Age: 39
End: 2024-08-06
Payer: MEDICAID

## 2024-08-06 DIAGNOSIS — C62.12 SEMINOMA OF DESCENDED LEFT TESTIS (HCC): Primary | ICD-10-CM

## 2024-08-06 LAB
BILIRUBIN, URINE, POC: NEGATIVE
BLOOD URINE, POC: NORMAL
GLUCOSE URINE, POC: NEGATIVE
KETONES, URINE, POC: NEGATIVE
LEUKOCYTE ESTERASE, URINE, POC: NEGATIVE
NITRITE, URINE, POC: NEGATIVE
PH, URINE, POC: 5.5 (ref 4.6–8)
PROTEIN,URINE, POC: NEGATIVE
SPECIFIC GRAVITY, URINE, POC: 1.03 (ref 1–1.03)
URINALYSIS CLARITY, POC: NORMAL
URINALYSIS COLOR, POC: NORMAL
UROBILINOGEN, POC: NORMAL

## 2024-08-06 PROCEDURE — 81003 URINALYSIS AUTO W/O SCOPE: CPT | Performed by: UROLOGY

## 2024-08-06 PROCEDURE — 99214 OFFICE O/P EST MOD 30 MIN: CPT | Performed by: UROLOGY

## 2024-08-06 NOTE — PROGRESS NOTES
alert, well appearing, and in no distress  Mental status - alert, oriented to person, place, and time  Eyes - extraocular eye movements intact, sclera anicteric  Abdomen - soft, nontender, nondistended, no masses or organomegaly.  L inguinal incision healing well.  No palpable inguinal mass  - R testis without mass or tenderness  Neurological -  normal speech, no focal findings or movement disorder noted  Skin - normal coloration and turgor      Urinalysis  UA - Dipstick  Results for orders placed or performed in visit on 08/06/24   AMB POC URINALYSIS DIP STICK AUTO W/O MICRO   Result Value Ref Range    Color (UA POC)      Clarity (UA POC)      Glucose, Urine, POC Negative     Bilirubin, Urine, POC Negative     KETONES, Urine, POC Negative     Specific Gravity, Urine, POC 1.030 1.001 - 1.035    Blood (UA POC) Trace-intact     pH, Urine, POC 5.5 4.6 - 8.0    Protein, Urine, POC Negative     Urobilinogen, POC 0.2 mg/dL     Nitrite, Urine, POC Negative     Leukocyte Esterase, Urine, POC Negative          UA - Micro  WBC - 0  RBC - 0  Bacteria - 0  Epith - 0    Assessment/Plan    ICD-10-CM    1. Seminoma of descended left testis (HCC)  C62.12         I encouraged pt to cont monthly self exams and call for any new findings or  symptoms.  He will cont surveillance with onc (Sandip).  RTO prn    LYNDSEY ARTHUR DO    Total time for today's encounter including chart review, result review, documentation and face to face encounter was 31 minutes.

## 2024-10-16 ENCOUNTER — HOSPITAL ENCOUNTER (OUTPATIENT)
Dept: GENERAL RADIOLOGY | Age: 39
Discharge: HOME | End: 2024-10-19
Attending: PEDIATRICS
Payer: MEDICAID

## 2024-10-16 ENCOUNTER — HOSPITAL ENCOUNTER (OUTPATIENT)
Dept: CT IMAGING | Age: 39
Discharge: HOME | End: 2024-10-19
Attending: PEDIATRICS
Payer: MEDICAID

## 2024-10-16 DIAGNOSIS — Z85.47 H/O TESTICULAR CANCER: ICD-10-CM

## 2024-10-16 DIAGNOSIS — C62.90 SEMINOMA (HCC): ICD-10-CM

## 2024-10-16 PROCEDURE — 6360000004 HC RX CONTRAST MEDICATION: Performed by: PEDIATRICS

## 2024-10-16 PROCEDURE — 74177 CT ABD & PELVIS W/CONTRAST: CPT

## 2024-10-16 PROCEDURE — 71046 X-RAY EXAM CHEST 2 VIEWS: CPT

## 2024-10-16 RX ORDER — IOPAMIDOL 755 MG/ML
100 INJECTION, SOLUTION INTRAVASCULAR
Status: COMPLETED | OUTPATIENT
Start: 2024-10-16 | End: 2024-10-16

## 2024-10-16 RX ORDER — DIATRIZOATE MEGLUMINE AND DIATRIZOATE SODIUM 660; 100 MG/ML; MG/ML
15 SOLUTION ORAL; RECTAL
Status: DISCONTINUED | OUTPATIENT
Start: 2024-10-16 | End: 2024-10-20 | Stop reason: HOSPADM

## 2024-10-16 RX ADMIN — DIATRIZOATE MEGLUMINE AND DIATRIZOATE SODIUM 15 ML: 660; 100 LIQUID ORAL; RECTAL at 09:31

## 2024-10-16 RX ADMIN — IOPAMIDOL 100 ML: 755 INJECTION, SOLUTION INTRAVENOUS at 09:31

## 2024-10-24 ENCOUNTER — HOSPITAL ENCOUNTER (OUTPATIENT)
Dept: LAB | Age: 39
Discharge: HOME | End: 2024-10-24
Payer: MEDICAID

## 2024-10-24 ENCOUNTER — OFFICE VISIT (OUTPATIENT)
Dept: ONCOLOGY | Age: 39
End: 2024-10-24
Payer: MEDICAID

## 2024-10-24 VITALS
RESPIRATION RATE: 18 BRPM | OXYGEN SATURATION: 98 % | HEART RATE: 79 BPM | WEIGHT: 245 LBS | DIASTOLIC BLOOD PRESSURE: 77 MMHG | HEIGHT: 69 IN | BODY MASS INDEX: 36.29 KG/M2 | TEMPERATURE: 97.9 F | SYSTOLIC BLOOD PRESSURE: 136 MMHG

## 2024-10-24 DIAGNOSIS — Z85.47 H/O TESTICULAR CANCER: ICD-10-CM

## 2024-10-24 DIAGNOSIS — C62.90 SEMINOMA (HCC): Primary | ICD-10-CM

## 2024-10-24 DIAGNOSIS — C62.90 SEMINOMA (HCC): ICD-10-CM

## 2024-10-24 LAB
AFP-TM SERPL-MCNC: 2.73 NG/ML (ref 0–8.3)
ALBUMIN SERPL-MCNC: 4.1 G/DL (ref 3.5–5)
ALBUMIN/GLOB SERPL: 1.2 (ref 1–1.9)
ALP SERPL-CCNC: 128 U/L (ref 40–129)
ALT SERPL-CCNC: 39 U/L (ref 8–55)
ANION GAP SERPL CALC-SCNC: 10 MMOL/L (ref 9–18)
AST SERPL-CCNC: 28 U/L (ref 15–37)
BASOPHILS # BLD: 0.1 K/UL (ref 0–0.2)
BASOPHILS NFR BLD: 1 % (ref 0–2)
BILIRUB SERPL-MCNC: 0.2 MG/DL (ref 0–1.2)
BUN SERPL-MCNC: 20 MG/DL (ref 6–23)
CALCIUM SERPL-MCNC: 9 MG/DL (ref 8.8–10.2)
CHLORIDE SERPL-SCNC: 106 MMOL/L (ref 98–107)
CO2 SERPL-SCNC: 21 MMOL/L (ref 20–28)
CREAT SERPL-MCNC: 1.01 MG/DL (ref 0.8–1.3)
DIFFERENTIAL METHOD BLD: ABNORMAL
EOSINOPHIL # BLD: 0.2 K/UL (ref 0–0.8)
EOSINOPHIL NFR BLD: 3 % (ref 0.5–7.8)
ERYTHROCYTE [DISTWIDTH] IN BLOOD BY AUTOMATED COUNT: 12.4 % (ref 11.9–14.6)
GLOBULIN SER CALC-MCNC: 3.3 G/DL (ref 2.3–3.5)
GLUCOSE SERPL-MCNC: 132 MG/DL (ref 70–99)
HCG SERPL-ACNC: <1 MIU/ML
HCT VFR BLD AUTO: 41.5 % (ref 41.1–50.3)
HGB BLD-MCNC: 13.5 G/DL (ref 13.6–17.2)
IMM GRANULOCYTES # BLD AUTO: 0 K/UL (ref 0–0.5)
IMM GRANULOCYTES NFR BLD AUTO: 1 % (ref 0–5)
LDH SERPL L TO P-CCNC: 171 U/L (ref 127–281)
LYMPHOCYTES # BLD: 2 K/UL (ref 0.5–4.6)
LYMPHOCYTES NFR BLD: 33 % (ref 13–44)
MCH RBC QN AUTO: 28.5 PG (ref 26.1–32.9)
MCHC RBC AUTO-ENTMCNC: 32.5 G/DL (ref 31.4–35)
MCV RBC AUTO: 87.6 FL (ref 82–102)
MONOCYTES # BLD: 0.4 K/UL (ref 0.1–1.3)
MONOCYTES NFR BLD: 6 % (ref 4–12)
NEUTS SEG # BLD: 3.5 K/UL (ref 1.7–8.2)
NEUTS SEG NFR BLD: 56 % (ref 43–78)
NRBC # BLD: 0 K/UL (ref 0–0.2)
PLATELET # BLD AUTO: 259 K/UL (ref 150–450)
PMV BLD AUTO: 8.8 FL (ref 9.4–12.3)
POTASSIUM SERPL-SCNC: 3.8 MMOL/L (ref 3.5–5.1)
PROT SERPL-MCNC: 7.3 G/DL (ref 6.3–8.2)
RBC # BLD AUTO: 4.74 M/UL (ref 4.23–5.6)
SODIUM SERPL-SCNC: 137 MMOL/L (ref 136–145)
WBC # BLD AUTO: 6.2 K/UL (ref 4.3–11.1)

## 2024-10-24 PROCEDURE — 85025 COMPLETE CBC W/AUTO DIFF WBC: CPT

## 2024-10-24 PROCEDURE — 82105 ALPHA-FETOPROTEIN SERUM: CPT

## 2024-10-24 PROCEDURE — 84702 CHORIONIC GONADOTROPIN TEST: CPT

## 2024-10-24 PROCEDURE — 83615 LACTATE (LD) (LDH) ENZYME: CPT

## 2024-10-24 PROCEDURE — 99215 OFFICE O/P EST HI 40 MIN: CPT | Performed by: PEDIATRICS

## 2024-10-24 PROCEDURE — 80053 COMPREHEN METABOLIC PANEL: CPT

## 2024-10-24 PROCEDURE — 36415 COLL VENOUS BLD VENIPUNCTURE: CPT

## 2024-10-24 NOTE — PROGRESS NOTES
HISTORY OF PRESENT ILLNESS  José Miguel is a 39 y.o. y.o. male with Testicular CA    ABSTRACT  NEW PATIENT INTAKE     Referral Diagnosis: Malignant neoplasm of descended left testis     Referring Provider: Luis Rojas DO     Primary Care Provider: Marek Rubalcava MD     Family History of Cancer/ Hematology Disorders: Father with thyroid cancer and paternal uncle with unspecified type of cancer     Presenting Symptoms: Gradually enlarging left testis, left testicular pain/swelling      Chronological History of Pertinent Events: Mr. Pandey is a 38-year-old white male referred for testicular cancer.      4/9/24: Pt presented to the [facility] ED reporting gradually worsening left testicular pain and swelling x 3 weeks. He had been seen earlier at urgent care and was encouraged to go to the ED for ultrasound. Unfortunately, SCROTAL ULTRASOUND obtained in the ED identified a masslike area or areas within the left testicle concerning for testicular neoplasm and left varicocele. The right testicle was normal (Epic/Imaging). Pt was discharged with referral to Urology.      4/15/24: Urology consult with Dr. Luis Rojas  -AFP tumor marker WNL at 2.90 (Epic/Labs)  -CT C/A/P ordered for staging  -Treatment options discussed with patient who elected to pursue left radical orchiectomy     4/16/24: CT of the Chest, Abdomen, and Pelvis: No evidence of metastatic disease to the chest, abdomen, or pelvis. Fatty liver (Epic/Imaging)      4/19/24: Pt underwent left radical orchiectomy with pathology from the left scrotum revealing pure seminoma, measuring 3.9 cm in greatest dimension, confined to the testicular parenchyma with margins uninvolved (Epic/Labs)     4/24/24: Referred to [facility]     PATHOLOGY:      [facility] SURGICAL PATHOLOGY REPORT 4/19/24  DIAGNOSIS   “LEFT SCROTUM”: PURE SEMINOMA, 3.9 CM IN GREATEST DIMENSION, CONFINED TO THE TESTICULAR PARENCHYMA, MARGINS UNINVOLVED.     Notes from Referring Provider: None     Presented at Tumor

## 2024-10-24 NOTE — PATIENT INSTRUCTIONS
Patient Information from Today's Visit  The members of your [facility] are listed below:     Physician Provider: Kenton Oropeza Medical Oncologist  Advanced Practice Clinician: Alexia Francisco NP  Registered Nurse: Adriana BOBO RN  Navigator: N/A  Medical Assistant: Alexia AUGUST MA  : Yamilet WU   Supportive Care Services: Saranya HOPE LMSW     Diagnosis:   Follow up: Testicular cancer (Seminoma)   S/p left orchiectomy 4-19-24     Upcoming repeat scans: October (~1 week prior to visit)      Follow up in 3 months Labs/visit      Follow Up Instructions:       Treatment Summary has been discussed and given to patient: N/A      Current Labs:   Hospital Outpatient Visit on 10/24/2024   Component Date Value Ref Range Status    WBC 10/24/2024 6.2  4.3 - 11.1 K/uL Final    RBC 10/24/2024 4.74  4.23 - 5.6 M/uL Final    Hemoglobin 10/24/2024 13.5 (L)  13.6 - 17.2 g/dL Final    Hematocrit 10/24/2024 41.5  41.1 - 50.3 % Final    MCV 10/24/2024 87.6  82.0 - 102.0 FL Final    MCH 10/24/2024 28.5  26.1 - 32.9 PG Final    MCHC 10/24/2024 32.5  31.4 - 35.0 g/dL Final    RDW 10/24/2024 12.4  11.9 - 14.6 % Final    Platelets 10/24/2024 259  150 - 450 K/uL Final    MPV 10/24/2024 8.8 (L)  9.4 - 12.3 FL Final    nRBC 10/24/2024 0.00  0.0 - 0.2 K/uL Final    **Note: Absolute NRBC parameter is now reported with Hemogram**    Differential Type 10/24/2024 AUTOMATED    Final    Neutrophils % 10/24/2024 56  43 - 78 % Final    Lymphocytes % 10/24/2024 33  13 - 44 % Final    Monocytes % 10/24/2024 6  4.0 - 12.0 % Final    Eosinophils % 10/24/2024 3  0.5 - 7.8 % Final    Basophils % 10/24/2024 1  0.0 - 2.0 % Final    Immature Granulocytes % 10/24/2024 1  0.0 - 5.0 % Final    Neutrophils Absolute 10/24/2024 3.5  1.7 - 8.2 K/UL Final    Lymphocytes Absolute 10/24/2024 2.0  0.5 - 4.6 K/UL Final    Monocytes Absolute 10/24/2024 0.4  0.1 - 1.3 K/UL Final    Eosinophils Absolute 10/24/2024 0.2  0.0 - 0.8 K/UL Final    Basophils

## 2025-02-11 DIAGNOSIS — C62.90 SEMINOMA (HCC): Primary | ICD-10-CM

## 2025-02-11 NOTE — PROGRESS NOTES
Urologic Oncology  [facility] Hematology & Oncology  [address] [place] [id number]  [phone]          José Miguel Pandey  : 1985      INITIAL EVALUATION    Chief Complaint   Patient presents with    Follow-up       HPI: José Miguel Pandey is a 39 y.o. male with testicular cancer.  Patient is here today to establish care with me.  He is a former patient of Dr. Metcalf and Dr. Aquino.  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  He has been on active surveillance for his clinical stage I pure seminoma.    His mother and nephew are with him today.  He lives in [place].    He has no complaints today.  Denies any significant weight loss.  His last CT scan was 10/16/2024 and was unremarkable.  He also had a chest x-ray done that was unremarkable.  Previously his AFP and beta-hCG were within normal limits in October.    Today's hCG is less than 1 and his AFP is 2.1.  LDH is 185.    PMH:     Past Medical History:   Diagnosis Date    High cholesterol        PSH:    Past Surgical History:   Procedure Laterality Date    CYST REMOVAL      Shoulder    TESTICLE REMOVAL Left 2024    ORCHIECTOMY RADICAL/ LEFT performed by Luis Rojas DO at [facility] MAIN OR       MEDs:    Current Outpatient Medications   Medication Sig Dispense Refill    diphenhydrAMINE (BENADRYL) 25 MG tablet Take 1 tablet by mouth every 6 hours as needed for Itching      ibuprofen (ADVIL;MOTRIN) 600 MG tablet Take 1 tablet by mouth 3 times daily as needed for Pain 21 tablet 0    ondansetron (ZOFRAN-ODT) 4 MG disintegrating tablet Take 1 tablet by mouth every 8 hours as needed       No current facility-administered medications for this visit.       ALLERGIES:     No Known Allergies    FH:     Family History   Problem Relation Age of Onset    Thyroid Cancer Father 15    Heart Attack Father     Autism Child     Autism Child     Cancer Paternal Uncle

## 2025-02-14 ENCOUNTER — HOSPITAL ENCOUNTER (OUTPATIENT)
Dept: LAB | Age: 40
Discharge: HOME | End: 2025-02-14
Payer: MEDICAID

## 2025-02-14 ENCOUNTER — OFFICE VISIT (OUTPATIENT)
Dept: ONCOLOGY | Age: 40
End: 2025-02-14
Payer: MEDICAID

## 2025-02-14 VITALS
HEIGHT: 69 IN | SYSTOLIC BLOOD PRESSURE: 124 MMHG | HEART RATE: 74 BPM | DIASTOLIC BLOOD PRESSURE: 76 MMHG | WEIGHT: 239.1 LBS | TEMPERATURE: 97.9 F | BODY MASS INDEX: 35.41 KG/M2 | RESPIRATION RATE: 16 BRPM | OXYGEN SATURATION: 94 %

## 2025-02-14 DIAGNOSIS — C62.90 SEMINOMA (HCC): ICD-10-CM

## 2025-02-14 DIAGNOSIS — Z85.47 H/O TESTICULAR CANCER: ICD-10-CM

## 2025-02-14 DIAGNOSIS — C62.90 SEMINOMA (HCC): Primary | ICD-10-CM

## 2025-02-14 LAB
AFP-TM SERPL-MCNC: 2.18 NG/ML (ref 0–8.3)
HCG SERPL-ACNC: <1 MIU/ML
LDH SERPL L TO P-CCNC: 185 U/L (ref 127–281)

## 2025-02-14 PROCEDURE — 36415 COLL VENOUS BLD VENIPUNCTURE: CPT

## 2025-02-14 PROCEDURE — 99215 OFFICE O/P EST HI 40 MIN: CPT | Performed by: UROLOGY

## 2025-02-14 PROCEDURE — 84702 CHORIONIC GONADOTROPIN TEST: CPT

## 2025-02-14 PROCEDURE — 83615 LACTATE (LD) (LDH) ENZYME: CPT

## 2025-02-14 PROCEDURE — 82105 ALPHA-FETOPROTEIN SERUM: CPT

## 2025-02-14 ASSESSMENT — PATIENT HEALTH QUESTIONNAIRE - PHQ9
1. LITTLE INTEREST OR PLEASURE IN DOING THINGS: NOT AT ALL
SUM OF ALL RESPONSES TO PHQ QUESTIONS 1-9: 0
SUM OF ALL RESPONSES TO PHQ QUESTIONS 1-9: 0
SUM OF ALL RESPONSES TO PHQ9 QUESTIONS 1 & 2: 0
SUM OF ALL RESPONSES TO PHQ QUESTIONS 1-9: 0
2. FEELING DOWN, DEPRESSED OR HOPELESS: NOT AT ALL
SUM OF ALL RESPONSES TO PHQ QUESTIONS 1-9: 0

## 2025-02-14 ASSESSMENT — ENCOUNTER SYMPTOMS
RESPIRATORY NEGATIVE: 1
GASTROINTESTINAL NEGATIVE: 1

## 2025-02-14 NOTE — PATIENT INSTRUCTIONS
Patient Information from Today's Visit    The members of your [facility] are listed below:    Physician Provider: Renny Campos Urologic Oncologist  Advanced Practice Clinician: SONIDO Carlisle  Medical Assistant: Kristel HOPE CMA  :Michelle DVAID  Supportive Care Services: Juju LOPEZ LMSW    Diagnosis: Testicular     Follow Up Instructions:   Records reviewed  We will plan to see you back in 2 months with a CT scan. You can call radiology scheduling at [phone] to get this scheduled.  If you need anything prior please do not hesitate to call our office.  Treatment Summary has been discussed and given to patient:N/A      Current Labs:   Hospital Outpatient Visit on 02/14/2025   Component Date Value Ref Range Status    LD 02/14/2025 185  127 - 281 U/L Final               Please refer to After Visit Summary or Granicushart for upcoming appointment information. Please call our office for rescheduling needs at least 24 hours before your scheduled appointment time.If you have any questions regarding your upcoming schedule please reach out to your care team through CompStak or call [phone].     Please notify your assigned Nurse Navigator of any unplanned hospital admissions or Emergency Room visits within 24 hours of discharge.    -------------------------------------------------------------------------------------------------------------------  Please call our office at [phone] if you have any  of the following symptoms:   Fever of 100.5 or greater  Chills  Shortness of breath  Swelling or pain in one leg    After office hours an answering service is available and will contact a provider for emergencies or if you are experiencing any of the above symptoms.        rKistel Mendez MA

## 2025-04-14 ENCOUNTER — HOSPITAL ENCOUNTER (OUTPATIENT)
Dept: CT IMAGING | Age: 40
Discharge: HOME | End: 2025-04-16
Attending: UROLOGY
Payer: MEDICAID

## 2025-04-14 DIAGNOSIS — Z85.47 H/O TESTICULAR CANCER: ICD-10-CM

## 2025-04-14 DIAGNOSIS — C62.90 SEMINOMA (HCC): ICD-10-CM

## 2025-04-14 LAB
ALBUMIN SERPL-MCNC: 4.2 G/DL (ref 3.5–5)
ALBUMIN/GLOB SERPL: 1.4 (ref 1–1.9)
ALP SERPL-CCNC: 114 U/L (ref 40–129)
ALT SERPL-CCNC: 44 U/L (ref 8–55)
ANION GAP SERPL CALC-SCNC: 11 MMOL/L (ref 7–16)
AST SERPL-CCNC: 31 U/L (ref 15–37)
BASOPHILS # BLD: 0.07 K/UL (ref 0–0.2)
BASOPHILS NFR BLD: 1.3 % (ref 0–2)
BILIRUB SERPL-MCNC: 0.4 MG/DL (ref 0–1.2)
BUN SERPL-MCNC: 21 MG/DL (ref 6–23)
CALCIUM SERPL-MCNC: 9.4 MG/DL (ref 8.8–10.2)
CHLORIDE SERPL-SCNC: 107 MMOL/L (ref 98–107)
CO2 SERPL-SCNC: 24 MMOL/L (ref 20–29)
CREAT SERPL-MCNC: 1.17 MG/DL (ref 0.8–1.3)
DIFFERENTIAL METHOD BLD: ABNORMAL
EOSINOPHIL # BLD: 0.16 K/UL (ref 0–0.8)
EOSINOPHIL NFR BLD: 2.9 % (ref 0.5–7.8)
ERYTHROCYTE [DISTWIDTH] IN BLOOD BY AUTOMATED COUNT: 12.8 % (ref 11.9–14.6)
GLOBULIN SER CALC-MCNC: 2.9 G/DL (ref 2.3–3.5)
GLUCOSE SERPL-MCNC: 108 MG/DL (ref 70–99)
HCG SERPL-ACNC: <1 MIU/ML
HCT VFR BLD AUTO: 43.8 % (ref 41.1–50.3)
HGB BLD-MCNC: 14.2 G/DL (ref 13.6–17.2)
IMM GRANULOCYTES # BLD AUTO: 0.02 K/UL (ref 0–0.5)
IMM GRANULOCYTES NFR BLD AUTO: 0.4 % (ref 0–5)
LDH SERPL L TO P-CCNC: 154 U/L (ref 127–281)
LYMPHOCYTES # BLD: 1.86 K/UL (ref 0.5–4.6)
LYMPHOCYTES NFR BLD: 34 % (ref 13–44)
MCH RBC QN AUTO: 28.5 PG (ref 26.1–32.9)
MCHC RBC AUTO-ENTMCNC: 32.4 G/DL (ref 31.4–35)
MCV RBC AUTO: 88 FL (ref 82–102)
MONOCYTES # BLD: 0.4 K/UL (ref 0.1–1.3)
MONOCYTES NFR BLD: 7.3 % (ref 4–12)
NEUTS SEG # BLD: 2.96 K/UL (ref 1.7–8.2)
NEUTS SEG NFR BLD: 54.1 % (ref 43–78)
NRBC # BLD: 0 K/UL (ref 0–0.2)
PLATELET # BLD AUTO: 291 K/UL (ref 150–450)
PMV BLD AUTO: 9.2 FL (ref 9.4–12.3)
POTASSIUM SERPL-SCNC: 4.3 MMOL/L (ref 3.5–5.1)
PROT SERPL-MCNC: 7.1 G/DL (ref 6.3–8.2)
RBC # BLD AUTO: 4.98 M/UL (ref 4.23–5.6)
SODIUM SERPL-SCNC: 141 MMOL/L (ref 136–145)
WBC # BLD AUTO: 5.5 K/UL (ref 4.3–11.1)

## 2025-04-14 PROCEDURE — 74177 CT ABD & PELVIS W/CONTRAST: CPT

## 2025-04-14 PROCEDURE — 6360000004 HC RX CONTRAST MEDICATION: Performed by: UROLOGY

## 2025-04-14 RX ORDER — IOPAMIDOL 755 MG/ML
100 INJECTION, SOLUTION INTRAVASCULAR
Status: COMPLETED | OUTPATIENT
Start: 2025-04-14 | End: 2025-04-14

## 2025-04-14 RX ADMIN — IOPAMIDOL 100 ML: 755 INJECTION, SOLUTION INTRAVENOUS at 09:30

## 2025-04-15 LAB — AFP-TM SERPL-MCNC: 3.04 NG/ML (ref 0–8.3)

## 2025-04-21 ENCOUNTER — OFFICE VISIT (OUTPATIENT)
Dept: ONCOLOGY | Age: 40
End: 2025-04-21
Payer: MEDICAID

## 2025-04-21 ENCOUNTER — HOSPITAL ENCOUNTER (OUTPATIENT)
Dept: GENERAL RADIOLOGY | Age: 40
Discharge: HOME | End: 2025-04-23
Payer: MEDICAID

## 2025-04-21 VITALS
HEIGHT: 69 IN | DIASTOLIC BLOOD PRESSURE: 77 MMHG | WEIGHT: 239 LBS | HEART RATE: 73 BPM | SYSTOLIC BLOOD PRESSURE: 118 MMHG | RESPIRATION RATE: 18 BRPM | BODY MASS INDEX: 35.4 KG/M2 | TEMPERATURE: 97.6 F | OXYGEN SATURATION: 100 %

## 2025-04-21 DIAGNOSIS — C62.90 SEMINOMA (HCC): ICD-10-CM

## 2025-04-21 DIAGNOSIS — Z85.47 H/O TESTICULAR CANCER: Primary | ICD-10-CM

## 2025-04-21 DIAGNOSIS — Z85.47 H/O TESTICULAR CANCER: ICD-10-CM

## 2025-04-21 PROCEDURE — 99213 OFFICE O/P EST LOW 20 MIN: CPT | Performed by: UROLOGY

## 2025-04-21 PROCEDURE — 71046 X-RAY EXAM CHEST 2 VIEWS: CPT

## 2025-04-21 ASSESSMENT — ENCOUNTER SYMPTOMS
GASTROINTESTINAL NEGATIVE: 1
RESPIRATORY NEGATIVE: 1

## 2025-04-21 NOTE — PATIENT INSTRUCTIONS
Patient Information from Today's Visit    The members of your [facility] are listed below:    Physician Provider: Renny Campos, Urologic Oncologist  Advanced Practice Clinician: SONIDO Carlisle  Medical Assistant: Kristel HOPE CMA  :Michelle DAVID  Supportive Care Services: Juju LOPEZ LMSW    Diagnosis (Information Sheet Provided on Day of Diagnosis):   History of testicular cancer    Follow Up Instructions:   - CT scan and labs reviewed today, no evidence of recurrent cancer  - Chest xray is pending but looks good on my review today  - Follow up in 6 months with labs and CT prior. Please call the number below to schedule your CT and have your labs drawn at one of our outreach lab locations prior    You can call to schedule this at [phone].    [facility] Outreach Labs    [facility]: [address]  Phone: [phone] Hours: Mon - Fri 730am - 430pm    [facility]: [address], [address]  Phone: [phone] Hours: Mon - Fri 730am - 430pm    [facility]: [address]  Phone: [phone] Hours: Mon - Fri 700am - 430pm    [facility]: [address], [address]  Phone: [phone] Hours: Mon - Fri 730am - 430pm        Please refer to After Visit Summary or GenAudiot for upcoming appointment information. Please call our office for rescheduling needs at least 24 hours before your scheduled appointment time.If you have any questions regarding your upcoming schedule please reach out to your care team through ShopIt or call [phone].     Please notify your assigned Nurse Navigator of any unplanned hospital admissions or Emergency Room visits within 24 hours of discharge.    -------------------------------------------------------------------------------------------------------------------  Please call our office at [phone] if you have any  of the following symptoms:

## 2025-04-21 NOTE — PROGRESS NOTES
Urologic Oncology  [facility] Hematology & Oncology  [address] [place] [id number]  [phone]        Mr. José Miguel Pandey is a 39 y.o. male with a diagnosis of testicular cancer.    INTERVAL HISTORY: Patient is here today for follow-up of his left pure seminoma testicular cancer.  He underwent a left orchiectomy on 4/19/2024 by Dr. Aquino.  He was found to have clinical stage I disease and has been on surveillance.    He had a CT of the abdomen pelvis on 4/14/2025 that showed no evidence of metastatic disease.  He has a chest x-ray pending.    His serum tumor markers were unremarkable on 4/14/2025.  His hCG was less than 1.  aFP 3.04.  .            From previous note on 2/14/25:José Miguel Pandey is a 39 y.o. male with testicular cancer.  Patient is here today to establish care with me.  He is a former patient of Dr. Metcalf and Dr. Aquino.  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  He has been on active surveillance for his clinical stage I pure seminoma.     His mother and nephew are with him today.  He lives in [place].     He has no complaints today.  Denies any significant weight loss.  His last CT scan was 10/16/2024 and was unremarkable.  He also had a chest x-ray done that was unremarkable.  Previously his AFP and beta-hCG were within normal limits in October.     Today's hCG is less than 1 and his AFP is 2.1.  LDH is 185.    Past medical, family and social histories, as well as medications and allergies, were reviewed and updated in the medical record as appropriate.    PMH:     Past Medical History:   Diagnosis Date    High cholesterol        MEDs:     diphenhydrAMINE  ondansetron     ALLERGIES:    No Known Allergies    ROS:     Review of Systems   Constitutional:  Negative for chills, fatigue and fever.   Respiratory: Negative.     Cardiovascular: Negative.    Gastrointestinal: Negative.

## (undated) DEVICE — SUTURE VICRYL + SZ 0 L27IN ABSRB VLT L26MM UR-6 5/8 CIR VCP603H

## (undated) DEVICE — SUTURE PERMAHAND SZ 0 L30IN NONABSORBABLE BLK SILK BRAID A306H

## (undated) DEVICE — BLADE CLIPPER GEN PURP NS

## (undated) DEVICE — STRIP,CLOSURE,WOUND,MEDI-STRIP,1/2X4: Brand: MEDLINE

## (undated) DEVICE — SUTURE VICRYL + SZ 3-0 L27IN ABSRB UD L26MM SH 1/2 CIR VCP416H

## (undated) DEVICE — DRAIN SURG PENROSE 0.25X12 IN CLOSED WND DRAINAGE PREM SIL

## (undated) DEVICE — Device

## (undated) DEVICE — NEEDLE HYPO 25GA L1.5IN BLU POLYPR HUB S STL REG BVL STR

## (undated) DEVICE — PREMIUM WET SKIN PREP TRAY: Brand: MEDLINE INDUSTRIES, INC.

## (undated) DEVICE — 3M™ TEGADERM™ TRANSPARENT FILM DRESSING FRAME STYLE, 1626W, 4 IN X 4-3/4 IN (10 CM X 12 CM), 50/CT 4CT/CASE: Brand: 3M™ TEGADERM™

## (undated) DEVICE — SOLUTION IRRIG 1000ML 0.9% SOD CHL USP POUR PLAS BTL

## (undated) DEVICE — MINOR SPLIT GENERAL: Brand: MEDLINE INDUSTRIES, INC.

## (undated) DEVICE — SUTURE MONOCRYL SZ 4-0 L27IN ABSRB UD L19MM PS-2 1/2 CIR PRIM Y426H